# Patient Record
Sex: FEMALE | Race: WHITE | NOT HISPANIC OR LATINO | Employment: OTHER | URBAN - METROPOLITAN AREA
[De-identification: names, ages, dates, MRNs, and addresses within clinical notes are randomized per-mention and may not be internally consistent; named-entity substitution may affect disease eponyms.]

---

## 2017-08-03 ENCOUNTER — OFFICE VISIT (OUTPATIENT)
Dept: INTERNAL MEDICINE | Facility: MEDICAL CENTER | Age: 62
End: 2017-08-03
Payer: COMMERCIAL

## 2017-08-03 VITALS
HEART RATE: 68 BPM | BODY MASS INDEX: 21 KG/M2 | TEMPERATURE: 97.9 F | OXYGEN SATURATION: 97 % | WEIGHT: 123 LBS | SYSTOLIC BLOOD PRESSURE: 100 MMHG | HEIGHT: 64 IN | DIASTOLIC BLOOD PRESSURE: 63 MMHG

## 2017-08-03 DIAGNOSIS — Z00.00 LABORATORY EXAM ORDERED AS PART OF ROUTINE GENERAL MEDICAL EXAMINATION: ICD-10-CM

## 2017-08-03 DIAGNOSIS — M54.50 BILATERAL LOW BACK PAIN WITHOUT SCIATICA, UNSPECIFIED CHRONICITY: ICD-10-CM

## 2017-08-03 DIAGNOSIS — Z00.00 HEALTHCARE MAINTENANCE: ICD-10-CM

## 2017-08-03 PROCEDURE — 99204 OFFICE O/P NEW MOD 45 MIN: CPT | Performed by: INTERNAL MEDICINE

## 2017-08-03 ASSESSMENT — PATIENT HEALTH QUESTIONNAIRE - PHQ9: CLINICAL INTERPRETATION OF PHQ2 SCORE: 0

## 2017-08-03 NOTE — MR AVS SNAPSHOT
"        Monique Ortiz   8/3/2017 11:00 AM   Office Visit   MRN: [id number]    Department:  [facility]   Dept Phone:  [phone]    Description:  Female : 1955   Provider:  Nell Penny M.D.           Reason for Visit     Establish Care new to you       Allergies as of 8/3/2017     Allergen Noted Reactions    Aspirin 2017       Tight chest      You were diagnosed with     Healthcare maintenance   [[id number]]       Laboratory exam ordered as part of routine general medical examination   [[id number]]       Bilateral low back pain without sciatica, unspecified chronicity   [[id number]]       Patient is Zoroastrianism   [[id number]]         Vital Signs     Blood Pressure Pulse Temperature Height Weight Body Mass Index    100/63 mmHg 68 36.6 °C (97.9 °F) 1.626 m (5' 4.02\") 55.792 kg (123 lb) 21.10 kg/m2    Oxygen Saturation Smoking Status                97% Never Smoker           Basic Information     Date Of Birth Sex Race Ethnicity Preferred Language    1955 Female White Non- English      Your appointments     Aug 04, 2017  1:00 PM   Adult Draw/Collection with [facility]   [facility] (--)    [address]  [facility] [address]   [phone]            Aug 21, 2017  8:30 AM   MA DX30 with [facility] MG 2   [facility] ([address]    [address]  [facility] [id number] [phone]              Health Maintenance     Patient has no pending health maintenance at this time      Current Immunizations     No immunizations on file.      Below and/or attached are the medications your provider expects you to take. Review all of your home medications and newly ordered medications with your provider and/or pharmacist. Follow medication instructions as directed by your provider and/or pharmacist. Please keep your medication list with you and share with your provider. Update the information when medications are discontinued, doses are changed, or new " medications (including over-the-counter products) are added; and carry medication information at all times in the event of emergency situations     Allergies:  ASPIRIN - (reactions not documented)               Medications  Valid as of: August 03, 2017 -  4:10 PM    Generic Name Brand Name Tablet Size Instructions for use    .                 Medicines prescribed today were sent to:     [facility] #[id number] - [place], [place] - [address] [place] [address].    [address] [place] [address]    Phone: [phone] Fax: [phone]    Open 24 Hours?: No      Medication refill instructions:       If your prescription bottle indicates you have medication refills left, it is not necessary to call your provider’s office. Please contact your pharmacy and they will refill your medication.    If your prescription bottle indicates you do not have any refills left, you may request refills at any time through one of the following ways: The online [employer] system (except Urgent Care), by calling your provider’s office, or by asking your pharmacy to contact your provider’s office with a refill request. Medication refills are processed only during regular business hours and may not be available until the next business day. Your provider may request additional information or to have a follow-up visit with you prior to refilling your medication.   *Please Note: Medication refills are assigned a new Rx number when refilled electronically. Your pharmacy may indicate that no refills were authorized even though a new prescription for the same medication is available at the pharmacy. Please request the medicine by name with the pharmacy before contacting your provider for a refill.        Your To Do List     Future Labs/Procedures Complete By Expires    CBC WITH DIFFERENTIAL  As directed 8/3/2018    COMP METABOLIC PANEL  As directed 8/3/2018    MA-DIAGNOSTIC MAMMO W/CAD-BILAT  As directed 8/3/2018      Referral     A referral request has been sent to our  patient care coordination department. Please allow 3-5 business days for us to process this request and contact you either by phone or mail. If you do not hear from us by the 5th business day, please call us at [phone].           [employer] Access Code: Activation code not generated  Current BOARDZ Status: Active

## 2017-08-03 NOTE — PROGRESS NOTES
Monique Ortiz is a 61 y.o. female who is here to Establish care and is new to the clinic.     CC: back pain    HPI:    Monique Ortiz is a 61 year old female who is here for routine follow up. Has not been seen for years. She was seeing Dr. Stubbs about 3-4 years ago. Patient also brought paperwork for DPOA.     Sore back: 6 months., achy pain, mostly when she is sitting a long time and it was in the upper back and lower back. She is taking Tylenol for it once in a while. Usually, back pain is worse at the end of the day. She is retired from being a . She does bible studies and other volunteer work but nothing that would cause the back pain. It is not extremely bothersome to her at this point.     Patient denied any complaints of nausea, vomiting, chest pain, sciatica, pain anywhere else in the body, fatigue, or any other complaints. She is a Confucianist so she would not like any blood transfusions. She is active and eats healthy.     Healthcare Maintenance:  Mammogram .   PAP smear 3-4 years ago.   Colonoscopy was >10 years ago.     Allergies: Aspirin (Chest tightness).  Social: Drinks 5 glasses of wine weekly, never smoker, denied any other drug use, , currently not sexually active, has 2 kids and she gave birth at home.    Family Hx: Mom 85: pacemaker in place, Father  of [place] perez in the 80s, No HTN, DM. No other pertinent past family hx.     There are no diagnoses linked to this encounter.    No problem-specific assessment & plan notes found for this encounter.      She  has no past medical history of Breast cancer (CMS-[facility]).    There are no active problems to display for this patient.      Allergies:Aspirin    No current outpatient prescriptions on file.     No current facility-administered medications for this visit.       Social History   Substance Use Topics   • Smoking status: Never Smoker    • Smokeless tobacco: Never Used   • Alcohol Use: 3.0 oz/week     5  "Glasses of wine per week       Family History   Problem Relation Age of Onset   • Cancer Paternal Aunt        Review of Systems:     Pertinent positives as stated in HPI, all others reviewed as negative.    Physical Exam:  Blood pressure 100/63, pulse 68, temperature 36.6 °C (97.9 °F), height 1.626 m (5' 4.02\"), weight 55.792 kg (123 lb), SpO2 97 %. Body mass index is 21.1 kg/(m^2).    General Appearance: healthy, alert, no distress, cooperative  Skin: Skin color, texture, turgor normal. No rashes or lesions.  Head: Normocephalic. No masses appreciated.   Eyes: conjunctivae/corneas clear. PERRLA.  Ears: External ears normal.  Nose/Sinuses: Nares normal. Mucosa normal. No drainage or sinus tenderness.  Oropharynx: Lips, mucosa, and tongue normal. Teeth and gums normal. Oropharynx moist and without lesion.  Neck: Neck supple. No adenopathy. Thyroid symmetric, normal size, and without nodularity.  Lungs: Percussion normal. Lungs clear to auscultation bilaterally.  Heart: RRR without murmur, gallop, or rubs.  Abdomen: Soft, non-tender. BS normal. No masses,  No organomegaly  Musculoskeletal: Extremities: Upper and lower extremities appear normal. No deformities, edema. No tenderness in the back.   Peripheral Pulses: Pulses: radial=4/4, dorsalis pedis=4/4  Psychiatric: Mood appears normal.     Assessment/Plan:     Problem List Items Addressed This Visit     None      Visit Diagnoses     Healthcare maintenance         Relevant Orders     CBC WITH DIFFERENTIAL     COMP METABOLIC PANEL     LIPID PANEL     MA-DIAGNOSTIC MAMMO W/CAD-BILAT     REFERRAL TO GI FOR COLONOSCOPY     Laboratory exam ordered as part of routine general medical examination         Bilateral low back pain without sciatica, unspecified chronicity         Patient is Episcopal             · For the low back pain, would recommend symptomatic management. Taking Tylenol is fine, recommended heat/cold therapy as well. Not extremely bothersome but if it " gets worse, can be further evaluated.  · Will get basic labs, mammogram, and Colonoscopy for screening purposes.  · Patient is a Yazidism and would not like any blood transfusions.     Followup: Return in about 6 months (around 2/3/2018).

## 2017-08-08 ENCOUNTER — HOSPITAL ENCOUNTER (OUTPATIENT)
Dept: LAB | Facility: MEDICAL CENTER | Age: 62
End: 2017-08-08
Attending: INTERNAL MEDICINE
Payer: COMMERCIAL

## 2017-08-08 DIAGNOSIS — Z00.00 HEALTHCARE MAINTENANCE: ICD-10-CM

## 2017-08-08 LAB
ALBUMIN SERPL BCP-MCNC: 4 G/DL (ref 3.2–4.9)
ALBUMIN/GLOB SERPL: 1.4 G/DL
ALP SERPL-CCNC: 58 U/L (ref 30–99)
ALT SERPL-CCNC: 12 U/L (ref 2–50)
ANION GAP SERPL CALC-SCNC: 7 MMOL/L (ref 0–11.9)
AST SERPL-CCNC: 19 U/L (ref 12–45)
BASOPHILS # BLD AUTO: 0.7 % (ref 0–1.8)
BASOPHILS # BLD: 0.03 K/UL (ref 0–0.12)
BILIRUB SERPL-MCNC: 1.1 MG/DL (ref 0.1–1.5)
BUN SERPL-MCNC: 11 MG/DL (ref 8–22)
CALCIUM SERPL-MCNC: 10 MG/DL (ref 8.5–10.5)
CHLORIDE SERPL-SCNC: 105 MMOL/L (ref 96–112)
CHOLEST SERPL-MCNC: 221 MG/DL (ref 100–199)
CO2 SERPL-SCNC: 27 MMOL/L (ref 20–33)
CREAT SERPL-MCNC: 0.5 MG/DL (ref 0.5–1.4)
EOSINOPHIL # BLD AUTO: 0.12 K/UL (ref 0–0.51)
EOSINOPHIL NFR BLD: 2.8 % (ref 0–6.9)
ERYTHROCYTE [DISTWIDTH] IN BLOOD BY AUTOMATED COUNT: 42.6 FL (ref 35.9–50)
GFR SERPL CREATININE-BSD FRML MDRD: >60 ML/MIN/1.73 M 2
GLOBULIN SER CALC-MCNC: 2.9 G/DL (ref 1.9–3.5)
GLUCOSE SERPL-MCNC: 81 MG/DL (ref 65–99)
HCT VFR BLD AUTO: 42.9 % (ref 37–47)
HDLC SERPL-MCNC: 114 MG/DL
HGB BLD-MCNC: 13.9 G/DL (ref 12–16)
IMM GRANULOCYTES # BLD AUTO: 0 K/UL (ref 0–0.11)
IMM GRANULOCYTES NFR BLD AUTO: 0 % (ref 0–0.9)
LDLC SERPL CALC-MCNC: 98 MG/DL
LYMPHOCYTES # BLD AUTO: 1.68 K/UL (ref 1–4.8)
LYMPHOCYTES NFR BLD: 38.7 % (ref 22–41)
MCH RBC QN AUTO: 29.1 PG (ref 27–33)
MCHC RBC AUTO-ENTMCNC: 32.4 G/DL (ref 33.6–35)
MCV RBC AUTO: 89.7 FL (ref 81.4–97.8)
MONOCYTES # BLD AUTO: 0.33 K/UL (ref 0–0.85)
MONOCYTES NFR BLD AUTO: 7.6 % (ref 0–13.4)
NEUTROPHILS # BLD AUTO: 2.18 K/UL (ref 2–7.15)
NEUTROPHILS NFR BLD: 50.2 % (ref 44–72)
NRBC # BLD AUTO: 0 K/UL
NRBC BLD AUTO-RTO: 0 /100 WBC
PLATELET # BLD AUTO: 153 K/UL (ref 164–446)
PMV BLD AUTO: 9.9 FL (ref 9–12.9)
POTASSIUM SERPL-SCNC: 4.4 MMOL/L (ref 3.6–5.5)
PROT SERPL-MCNC: 6.9 G/DL (ref 6–8.2)
RBC # BLD AUTO: 4.78 M/UL (ref 4.2–5.4)
SODIUM SERPL-SCNC: 139 MMOL/L (ref 135–145)
TRIGL SERPL-MCNC: 43 MG/DL (ref 0–149)
WBC # BLD AUTO: 4.3 K/UL (ref 4.8–10.8)

## 2017-08-08 PROCEDURE — 80061 LIPID PANEL: CPT

## 2017-08-08 PROCEDURE — 80053 COMPREHEN METABOLIC PANEL: CPT

## 2017-08-08 PROCEDURE — 36415 COLL VENOUS BLD VENIPUNCTURE: CPT

## 2017-08-08 PROCEDURE — 85025 COMPLETE CBC W/AUTO DIFF WBC: CPT

## 2017-08-15 ENCOUNTER — TELEPHONE (OUTPATIENT)
Dept: RADIOLOGY | Facility: MEDICAL CENTER | Age: 62
End: 2017-08-15

## 2017-08-15 NOTE — TELEPHONE ENCOUNTER
LM FOR PT TO CALL - NEED TO CONFIRM SHE IS NOT HAVING ANY BREAST ISSUES; IF NOT, CAN R/S TO SCREENING AS  HAS SIGNED AN ORDER/TML

## 2017-08-21 ENCOUNTER — HOSPITAL ENCOUNTER (OUTPATIENT)
Dept: RADIOLOGY | Facility: MEDICAL CENTER | Age: 62
End: 2017-08-21
Attending: INTERNAL MEDICINE
Payer: COMMERCIAL

## 2017-08-21 DIAGNOSIS — Z12.31 VISIT FOR SCREENING MAMMOGRAM: ICD-10-CM

## 2017-08-21 PROCEDURE — 77063 BREAST TOMOSYNTHESIS BI: CPT

## 2017-11-07 ENCOUNTER — OFFICE VISIT (OUTPATIENT)
Dept: INTERNAL MEDICINE | Facility: MEDICAL CENTER | Age: 62
End: 2017-11-07
Payer: COMMERCIAL

## 2017-11-07 VITALS
HEIGHT: 64 IN | TEMPERATURE: 98.5 F | WEIGHT: 124 LBS | DIASTOLIC BLOOD PRESSURE: 55 MMHG | HEART RATE: 75 BPM | BODY MASS INDEX: 21.17 KG/M2 | SYSTOLIC BLOOD PRESSURE: 89 MMHG | OXYGEN SATURATION: 96 %

## 2017-11-07 DIAGNOSIS — Z00.00 HEALTHCARE MAINTENANCE: ICD-10-CM

## 2017-11-07 DIAGNOSIS — R04.0 BLEEDING FROM THE NOSE: ICD-10-CM

## 2017-11-07 DIAGNOSIS — Z23 NEED FOR INFLUENZA VACCINATION: ICD-10-CM

## 2017-11-07 PROCEDURE — 90686 IIV4 VACC NO PRSV 0.5 ML IM: CPT | Performed by: INTERNAL MEDICINE

## 2017-11-07 PROCEDURE — 90471 IMMUNIZATION ADMIN: CPT | Performed by: INTERNAL MEDICINE

## 2017-11-07 PROCEDURE — 99213 OFFICE O/P EST LOW 20 MIN: CPT | Mod: 25 | Performed by: INTERNAL MEDICINE

## 2017-11-07 NOTE — PROGRESS NOTES
"Monique Ortiz is a 61 y.o. female here for a non-provider visit for:   FLU    Reason for immunization: Annual Flu Vaccine  Immunization records indicate need for vaccine: Yes, confirmed with Epic  Minimum interval has been met for this vaccine: Yes  ABN completed: Not Indicated    Order and dose verified by: Kevin MOSS Dated  8/7/2015 was given to patient: Yes  All IAC Questionnaire questions were answered \"No.\"    Patient tolerated injection and no adverse effects were observed or reported: Yes    Pt scheduled for next dose in series: Not Indicated    "

## 2017-11-07 NOTE — PROGRESS NOTES
CC: Nosebleed    HPI:    Monique Ortiz is a 61 y.o. female who is here for complaints of having nosebleeds. Started years ago. Occurs more often in the summer months but recently have been occurring unexpectedly. She says it will occur when she is driving and going out and sometimes even when she is laying in bed. She made the appointment when it was bleeding and now it has stopped. She has never been tested for allergies but says that perfumes and things with a strong odor, bring upon allergie symptoms. She doesn't use a humidifier at home. Denied any scratching, picking the nose, no trauma to the nose. Denied any fever, chills. No bleeding from anywhere else. Not on aspirin or anticoagulants. She denied any family history of bleeding disorders. Patient denied any headache, visual changes, abdominal pain, diarrhea, constipation.       Recently, she had a colonoscopy done which showed 3 polyps and she said 2 of them were ability to become cancerous so it was recommended that she get a colonoscopy in 2-3 years.     Haven't had a flu shot yet but wants one. Needs to schedule for a PAP smear.   Wants to get the shingles vaccine. Advised to go to pharmacy.       No problem-specific Assessment & Plan notes found for this encounter.      She  has no past medical history of Breast cancer (CMS-[facility]).    There are no active problems to display for this patient.      Allergies:Aspirin    No current outpatient prescriptions on file.     No current facility-administered medications for this visit.        Social History   Substance Use Topics   • Smoking status: Never Smoker   • Smokeless tobacco: Never Used   • Alcohol use 3.0 oz/week     5 Glasses of wine per week       Family History   Problem Relation Age of Onset   • Cancer Paternal Aunt        Review of Systems:     Pertinent positives as stated in HPI, all others reviewed as negative.    Physical Exam:  Blood pressure (!) 89/55, pulse 75, temperature 36.9 °C  "(98.5 °F), height 1.626 m (5' 4.02\"), weight 56.2 kg (124 lb), SpO2 96 %. Body mass index is 21.27 kg/m².    General Appearance: healthy, alert, no distress, cooperative  Skin: No rashes or lesions.  Head: Normocephalic. No masses appreciated.   Nose: External nose examination without any external lesions or deformities noted. Internal examination of the left nare shows a dried scab and erythema but otherwise no internal lesions seen. Right nare appears intact, patent, without erythema or any lesions or deformities.   Lungs: Lungs clear to auscultation bilaterally.  Heart: RRR without murmur, gallop, or rubs.  Psychiatric: Mood appears normal.     Assessment/Plan:     1. Need for influenza vaccination  - Will administer flu vaccine.   - Flu Quad Inj >3 Year Pre-Filled PF    2. Bleeding from the nose  - Likely secondary to dryness. Could have allergic or vasomotor rhinitis.   - No trauma, not on any medications that may cause it, no family history of bleeding disorders.   - Has been a major issue recently for her. Occurs in unexpected situations.  - Wanted to get it cauterized. Not actively bleeding at this time.   - Recommended humidifier.   - Will make referral to ENT for any recommendations.  - REFERRAL TO ENT    3. Healthcare maintenance  - Up to date on mammogram. Due in 2018.  - Had colonoscopy done which showed 3 polyps with 2 of them with potential malignant transformation.  - Recommended repeat colonoscopy in 2-3 years.  - Will get flu vaccine.  - Shingles from pharmacy.  - Schedule an appointment for a PAP smear early in 2018.   - Went over recent lab work in detail. No need for statin.       Followup: Return in about 1 year (around 11/7/2018), or if symptoms worsen or fail to improve.    "

## 2018-05-07 ENCOUNTER — APPOINTMENT (RX ONLY)
Dept: URBAN - METROPOLITAN AREA CLINIC 4 | Facility: CLINIC | Age: 63
Setting detail: DERMATOLOGY
End: 2018-05-07

## 2018-05-07 DIAGNOSIS — L82.1 OTHER SEBORRHEIC KERATOSIS: ICD-10-CM

## 2018-05-07 DIAGNOSIS — D22 MELANOCYTIC NEVI: ICD-10-CM

## 2018-05-07 DIAGNOSIS — D18.0 HEMANGIOMA: ICD-10-CM

## 2018-05-07 DIAGNOSIS — L81.4 OTHER MELANIN HYPERPIGMENTATION: ICD-10-CM

## 2018-05-07 DIAGNOSIS — L73.8 OTHER SPECIFIED FOLLICULAR DISORDERS: ICD-10-CM

## 2018-05-07 PROBLEM — D22.61 MELANOCYTIC NEVI OF RIGHT UPPER LIMB, INCLUDING SHOULDER: Status: ACTIVE | Noted: 2018-05-07

## 2018-05-07 PROBLEM — D22.62 MELANOCYTIC NEVI OF LEFT UPPER LIMB, INCLUDING SHOULDER: Status: ACTIVE | Noted: 2018-05-07

## 2018-05-07 PROBLEM — D18.01 HEMANGIOMA OF SKIN AND SUBCUTANEOUS TISSUE: Status: ACTIVE | Noted: 2018-05-07

## 2018-05-07 PROBLEM — D22.5 MELANOCYTIC NEVI OF TRUNK: Status: ACTIVE | Noted: 2018-05-07

## 2018-05-07 PROBLEM — D22.71 MELANOCYTIC NEVI OF RIGHT LOWER LIMB, INCLUDING HIP: Status: ACTIVE | Noted: 2018-05-07

## 2018-05-07 PROBLEM — D22.72 MELANOCYTIC NEVI OF LEFT LOWER LIMB, INCLUDING HIP: Status: ACTIVE | Noted: 2018-05-07

## 2018-05-07 PROBLEM — D48.5 NEOPLASM OF UNCERTAIN BEHAVIOR OF SKIN: Status: ACTIVE | Noted: 2018-05-07

## 2018-05-07 PROCEDURE — ? ADDITIONAL NOTES

## 2018-05-07 PROCEDURE — 99202 OFFICE O/P NEW SF 15 MIN: CPT | Mod: 25

## 2018-05-07 PROCEDURE — ? BIOPSY BY SHAVE METHOD

## 2018-05-07 PROCEDURE — ? COUNSELING

## 2018-05-07 PROCEDURE — 11100: CPT

## 2018-05-07 PROCEDURE — ? SUNSCREEN RECOMMENDATIONS

## 2018-05-07 ASSESSMENT — LOCATION SIMPLE DESCRIPTION DERM
LOCATION SIMPLE: LEFT ANTERIOR NECK
LOCATION SIMPLE: RIGHT HAND
LOCATION SIMPLE: LEFT UPPER BACK
LOCATION SIMPLE: LEFT FOREARM
LOCATION SIMPLE: RIGHT FOREARM
LOCATION SIMPLE: LEFT HAND
LOCATION SIMPLE: LEFT CHEEK
LOCATION SIMPLE: LEFT THIGH
LOCATION SIMPLE: RIGHT CHEEK
LOCATION SIMPLE: RIGHT LOWER BACK
LOCATION SIMPLE: ABDOMEN
LOCATION SIMPLE: RIGHT POSTERIOR UPPER ARM
LOCATION SIMPLE: UPPER BACK
LOCATION SIMPLE: LEFT POSTERIOR UPPER ARM
LOCATION SIMPLE: RIGHT THIGH

## 2018-05-07 ASSESSMENT — LOCATION DETAILED DESCRIPTION DERM
LOCATION DETAILED: PERIUMBILICAL SKIN
LOCATION DETAILED: RIGHT PROXIMAL DORSAL FOREARM
LOCATION DETAILED: RIGHT ANTERIOR PROXIMAL THIGH
LOCATION DETAILED: LEFT INFERIOR CENTRAL MALAR CHEEK
LOCATION DETAILED: LEFT INFERIOR ANTERIOR NECK
LOCATION DETAILED: RIGHT CENTRAL MALAR CHEEK
LOCATION DETAILED: RIGHT INFERIOR MEDIAL MALAR CHEEK
LOCATION DETAILED: LEFT ULNAR DORSAL HAND
LOCATION DETAILED: SUPERIOR THORACIC SPINE
LOCATION DETAILED: LEFT PROXIMAL POSTERIOR UPPER ARM
LOCATION DETAILED: LEFT CENTRAL MALAR CHEEK
LOCATION DETAILED: LEFT ANTERIOR PROXIMAL THIGH
LOCATION DETAILED: LEFT SUPERIOR MEDIAL UPPER BACK
LOCATION DETAILED: LEFT INFERIOR MEDIAL MALAR CHEEK
LOCATION DETAILED: RIGHT DISTAL POSTERIOR UPPER ARM
LOCATION DETAILED: RIGHT RIB CAGE
LOCATION DETAILED: RIGHT RADIAL DORSAL HAND
LOCATION DETAILED: LEFT PROXIMAL DORSAL FOREARM
LOCATION DETAILED: RIGHT SUPERIOR MEDIAL MIDBACK

## 2018-05-07 ASSESSMENT — LOCATION ZONE DERM
LOCATION ZONE: HAND
LOCATION ZONE: NECK
LOCATION ZONE: ARM
LOCATION ZONE: FACE
LOCATION ZONE: LEG
LOCATION ZONE: TRUNK

## 2018-05-07 NOTE — PROCEDURE: ADDITIONAL NOTES
Additional Notes: Patient will have one removed today and will see how she heals. If she does not mind the way her scar and healing process is, we will remove remaining in one appointment made by PT.

## 2018-08-24 ENCOUNTER — APPOINTMENT (RX ONLY)
Dept: URBAN - METROPOLITAN AREA CLINIC 4 | Facility: CLINIC | Age: 63
Setting detail: DERMATOLOGY
End: 2018-08-24

## 2018-08-24 DIAGNOSIS — D22 MELANOCYTIC NEVI: ICD-10-CM

## 2018-08-24 DIAGNOSIS — L82.1 OTHER SEBORRHEIC KERATOSIS: ICD-10-CM

## 2018-08-24 PROBLEM — D48.5 NEOPLASM OF UNCERTAIN BEHAVIOR OF SKIN: Status: ACTIVE | Noted: 2018-08-24

## 2018-08-24 PROCEDURE — ? BIOPSY BY SHAVE METHOD AND DESTRUCTION

## 2018-08-24 PROCEDURE — 99212 OFFICE O/P EST SF 10 MIN: CPT | Mod: 25

## 2018-08-24 PROCEDURE — 11100: CPT

## 2018-08-24 PROCEDURE — ? COUNSELING

## 2018-08-24 ASSESSMENT — LOCATION ZONE DERM
LOCATION ZONE: NECK
LOCATION ZONE: TRUNK

## 2018-08-24 ASSESSMENT — LOCATION DETAILED DESCRIPTION DERM
LOCATION DETAILED: RIGHT MEDIAL UPPER BACK
LOCATION DETAILED: LEFT CENTRAL LATERAL NECK

## 2018-08-24 ASSESSMENT — LOCATION SIMPLE DESCRIPTION DERM
LOCATION SIMPLE: NECK
LOCATION SIMPLE: RIGHT UPPER BACK

## 2018-08-24 NOTE — PROCEDURE: BIOPSY BY SHAVE METHOD AND DESTRUCTION
Bill For Surgical Tray: no
Billing Type: Third-Party Bill
Bill As?: Biopsy by Shave Method
Anesthesia Type: 1% lidocaine with epinephrine
Wound Care: Petrolatum
Lab: [id number]
Size Of Lesion After Curettage: 0
Detail Level: Detailed
Lab Facility: 
Post-Care Instructions: I reviewed with the patient in detail post-care instructions. Patient is to keep the biopsy site dry overnight, and then apply bacitracin twice daily until healed. Patient may apply hydrogen peroxide soaks to remove any crusting.
Destruction Type: electrodesiccation
Number Of Curettages: 3
Biopsy Type: H and E
Hemostasis: Drysol
Anesthesia Volume In Cc: 2
Notification Instructions: Patient will be notified of biopsy results. However, patient instructed to call the office if not contacted within 2 weeks.
Consent: Written consent was obtained and risks were reviewed including but not limited to scarring, infection, bleeding, scabbing, incomplete removal, nerve damage and allergy to anesthesia.

## 2019-11-15 ENCOUNTER — PATIENT OUTREACH (OUTPATIENT)
Dept: HEALTH INFORMATION MANAGEMENT | Facility: OTHER | Age: 64
End: 2019-11-15

## 2019-11-15 NOTE — PROGRESS NOTES
Outcome: No answer     Please transfer to Patient Outreach Team at [phone] when patient returns call.    [facility] Verified: yes    Attempt # 1

## 2020-02-21 ENCOUNTER — OFFICE VISIT (OUTPATIENT)
Dept: URGENT CARE | Facility: PHYSICIAN GROUP | Age: 65
End: 2020-02-21
Payer: COMMERCIAL

## 2020-02-21 VITALS
SYSTOLIC BLOOD PRESSURE: 110 MMHG | RESPIRATION RATE: 18 BRPM | HEART RATE: 72 BPM | DIASTOLIC BLOOD PRESSURE: 66 MMHG | OXYGEN SATURATION: 98 % | BODY MASS INDEX: 21.51 KG/M2 | TEMPERATURE: 97.8 F | HEIGHT: 64 IN | WEIGHT: 126 LBS

## 2020-02-21 DIAGNOSIS — J06.9 UPPER RESPIRATORY TRACT INFECTION, UNSPECIFIED TYPE: ICD-10-CM

## 2020-02-21 PROCEDURE — 99203 OFFICE O/P NEW LOW 30 MIN: CPT | Performed by: PHYSICIAN ASSISTANT

## 2020-02-21 ASSESSMENT — ENCOUNTER SYMPTOMS
MYALGIAS: 0
SINUS PAIN: 1
EYE REDNESS: 0
VOMITING: 0
COUGH: 1
DIARRHEA: 0
CHILLS: 0
DIZZINESS: 0
SPUTUM PRODUCTION: 1
NECK PAIN: 0
SORE THROAT: 1
FEVER: 0
SINUS PRESSURE: 1
WHEEZING: 0
EYE DISCHARGE: 0
HEADACHES: 1

## 2020-02-21 NOTE — PROGRESS NOTES
"Subjective:      Monique Ortiz is a 64 y.o. female who presents with Sinusitis (earaches, chest congestion, cough, fever blister, post nasal sore throat x1week)            Sinusitis   This is a new problem. Episode onset: 6-7 days ago. The problem has been gradually improving since onset. There has been no fever. Associated symptoms include congestion, coughing, headaches, sinus pressure and a sore throat. Pertinent negatives include no chills, ear pain or neck pain. (Pos for ear pressure.   ) Past treatments include acetaminophen. The treatment provided mild relief.       Review of Systems   Constitutional: Positive for malaise/fatigue. Negative for chills and fever.   HENT: Positive for congestion, sinus pressure, sinus pain and sore throat. Negative for ear discharge and ear pain.         Pos. For ear pressure     Eyes: Negative for discharge and redness.   Respiratory: Positive for cough and sputum production. Negative for wheezing.    Gastrointestinal: Negative for diarrhea and vomiting.   Genitourinary: Negative for dysuria.   Musculoskeletal: Negative for myalgias and neck pain.   Skin: Negative for itching and rash.   Neurological: Positive for headaches. Negative for dizziness.          Objective:     /66 (BP Location: Right arm, Patient Position: Sitting, BP Cuff Size: Adult)   Pulse 72   Temp 36.6 °C (97.8 °F) (Temporal)   Resp 18   Ht 1.626 m (5' 4\")   Wt 57.2 kg (126 lb)   SpO2 98%   BMI 21.63 kg/m²    PMH:  has no past medical history of Breast cancer (HCC).  MEDS: Reviewed .   ALLERGIES:   Allergies   Allergen Reactions   • Aspirin      Tight chest     SURGHX: History reviewed. No pertinent surgical history.  SOCHX:  reports that she has never smoked. She has never used smokeless tobacco. She reports current alcohol use of about 3.0 oz of alcohol per week. She reports that she does not use drugs.  FH: Family history was reviewed, no pertinent findings to report      Physical " Exam  Vitals signs reviewed.   Constitutional:       Appearance: Normal appearance. She is well-developed.   HENT:      Head: Normocephalic and atraumatic.      Ears:      Comments: Bilateral clear middle ear effusions.     Nose:      Comments: Rhinorrhea noted.     Mouth/Throat:      Comments: Posterior oropharynx is erythematous, positive postnasal drainage.  No evidence of exudate.  Eyes:      Conjunctiva/sclera: Conjunctivae normal.      Pupils: Pupils are equal, round, and reactive to light.   Neck:      Musculoskeletal: Normal range of motion and neck supple.   Cardiovascular:      Rate and Rhythm: Normal rate and regular rhythm.      Heart sounds: No murmur.   Pulmonary:      Effort: Pulmonary effort is normal. No respiratory distress.      Breath sounds: Normal breath sounds.   Musculoskeletal: Normal range of motion.      Right lower leg: No edema.      Left lower leg: No edema.   Lymphadenopathy:      Cervical: No cervical adenopathy.   Skin:     General: Skin is warm.      Findings: No rash.   Neurological:      Mental Status: She is alert and oriented to person, place, and time.   Psychiatric:         Mood and Affect: Mood normal.         Behavior: Behavior normal.         Thought Content: Thought content normal.                 Assessment/Plan:       1. Upper respiratory tract infection, unspecified type    It was explained today that due to the viral nature of the pt's illness, we will treat symptomatically today.   Encouraged OTC supportive meds PRN. Humidification, increase fluids, avoid night time dairy.     Patient does have history of sinus concerns in the past of which noted secondary bacterial infection in the sinuses and the sequela that this can cause.  She is to reach out if next week she has worsening symptoms i.e. sinus pain and pressure, dental pain, worsening headache and congestion and would be happy to then send an antibiotic however at this time it does appear that patient is improving  at this time.    Discussed side effects of OTC meds and any prescribed.  Given precautionary s/sx that mandate immediate follow up and evaluation in the ED. Advised of risks of not doing so.    DDX, Supportive care, and indications for immediate follow-up discussed with patient.    Instructed to return to clinic or nearest emergency department if we are not available for any change in condition, further concerns, or worsening of symptoms.    The patient  and/or guardian demonstrated a good understanding and agreed with the treatment plan.    Please note that this dictation was created using voice recognition software. I have made every reasonable attempt to correct obvious errors, but I expect that there are errors of grammar and possibly content that I did not discover before finalizing the note.

## 2020-08-17 ENCOUNTER — HOSPITAL ENCOUNTER (OUTPATIENT)
Dept: LAB | Facility: MEDICAL CENTER | Age: 65
End: 2020-08-17
Attending: INTERNAL MEDICINE
Payer: MEDICARE

## 2020-08-17 LAB
25(OH)D3 SERPL-MCNC: 21 NG/ML (ref 30–100)
ALBUMIN SERPL BCP-MCNC: 4.1 G/DL (ref 3.2–4.9)
ALBUMIN/GLOB SERPL: 1.7 G/DL
ALP SERPL-CCNC: 53 U/L (ref 30–99)
ALT SERPL-CCNC: 13 U/L (ref 2–50)
ANION GAP SERPL CALC-SCNC: 11 MMOL/L (ref 7–16)
AST SERPL-CCNC: 14 U/L (ref 12–45)
BASOPHILS # BLD AUTO: 0.3 % (ref 0–1.8)
BASOPHILS # BLD: 0.01 K/UL (ref 0–0.12)
BILIRUB SERPL-MCNC: 0.4 MG/DL (ref 0.1–1.5)
BUN SERPL-MCNC: 16 MG/DL (ref 8–22)
CALCIUM SERPL-MCNC: 9.7 MG/DL (ref 8.5–10.5)
CHLORIDE SERPL-SCNC: 105 MMOL/L (ref 96–112)
CHOLEST SERPL-MCNC: 221 MG/DL (ref 100–199)
CO2 SERPL-SCNC: 23 MMOL/L (ref 20–33)
CREAT SERPL-MCNC: 0.55 MG/DL (ref 0.5–1.4)
EOSINOPHIL # BLD AUTO: 0.07 K/UL (ref 0–0.51)
EOSINOPHIL NFR BLD: 1.8 % (ref 0–6.9)
ERYTHROCYTE [DISTWIDTH] IN BLOOD BY AUTOMATED COUNT: 44.4 FL (ref 35.9–50)
FASTING STATUS PATIENT QL REPORTED: NORMAL
FOLATE SERPL-MCNC: 12.9 NG/ML
GLOBULIN SER CALC-MCNC: 2.4 G/DL (ref 1.9–3.5)
GLUCOSE SERPL-MCNC: 83 MG/DL (ref 65–99)
HCT VFR BLD AUTO: 44.6 % (ref 37–47)
HDLC SERPL-MCNC: 95 MG/DL
HGB BLD-MCNC: 14.1 G/DL (ref 12–16)
IMM GRANULOCYTES # BLD AUTO: 0.01 K/UL (ref 0–0.11)
IMM GRANULOCYTES NFR BLD AUTO: 0.3 % (ref 0–0.9)
LDLC SERPL CALC-MCNC: 105 MG/DL
LYMPHOCYTES # BLD AUTO: 1.55 K/UL (ref 1–4.8)
LYMPHOCYTES NFR BLD: 39.6 % (ref 22–41)
MCH RBC QN AUTO: 29.1 PG (ref 27–33)
MCHC RBC AUTO-ENTMCNC: 31.6 G/DL (ref 33.6–35)
MCV RBC AUTO: 92.1 FL (ref 81.4–97.8)
MONOCYTES # BLD AUTO: 0.28 K/UL (ref 0–0.85)
MONOCYTES NFR BLD AUTO: 7.2 % (ref 0–13.4)
NEUTROPHILS # BLD AUTO: 1.99 K/UL (ref 2–7.15)
NEUTROPHILS NFR BLD: 50.8 % (ref 44–72)
NRBC # BLD AUTO: 0 K/UL
NRBC BLD-RTO: 0 /100 WBC
PLATELET # BLD AUTO: 142 K/UL (ref 164–446)
PMV BLD AUTO: 9.9 FL (ref 9–12.9)
POTASSIUM SERPL-SCNC: 4.1 MMOL/L (ref 3.6–5.5)
PROT SERPL-MCNC: 6.5 G/DL (ref 6–8.2)
RBC # BLD AUTO: 4.84 M/UL (ref 4.2–5.4)
SODIUM SERPL-SCNC: 139 MMOL/L (ref 135–145)
TRIGL SERPL-MCNC: 103 MG/DL (ref 0–149)
TSH SERPL DL<=0.005 MIU/L-ACNC: 1.16 UIU/ML (ref 0.38–5.33)
VIT B12 SERPL-MCNC: 544 PG/ML (ref 211–911)
WBC # BLD AUTO: 3.9 K/UL (ref 4.8–10.8)

## 2020-08-17 PROCEDURE — 85025 COMPLETE CBC W/AUTO DIFF WBC: CPT

## 2020-08-17 PROCEDURE — 82607 VITAMIN B-12: CPT

## 2020-08-17 PROCEDURE — 80053 COMPREHEN METABOLIC PANEL: CPT

## 2020-08-17 PROCEDURE — 36415 COLL VENOUS BLD VENIPUNCTURE: CPT

## 2020-08-17 PROCEDURE — 82306 VITAMIN D 25 HYDROXY: CPT

## 2020-08-17 PROCEDURE — 82746 ASSAY OF FOLIC ACID SERUM: CPT

## 2020-08-17 PROCEDURE — 84443 ASSAY THYROID STIM HORMONE: CPT

## 2020-08-17 PROCEDURE — 80061 LIPID PANEL: CPT

## 2020-10-23 ENCOUNTER — HOSPITAL ENCOUNTER (OUTPATIENT)
Dept: RADIOLOGY | Facility: MEDICAL CENTER | Age: 65
End: 2020-10-23
Attending: INTERNAL MEDICINE
Payer: MEDICARE

## 2020-10-23 DIAGNOSIS — Z12.31 VISIT FOR SCREENING MAMMOGRAM: ICD-10-CM

## 2020-10-23 PROCEDURE — 77067 SCR MAMMO BI INCL CAD: CPT

## 2020-12-16 ENCOUNTER — TELEPHONE (OUTPATIENT)
Dept: SCHEDULING | Facility: IMAGING CENTER | Age: 65
End: 2020-12-16

## 2021-01-14 ENCOUNTER — OFFICE VISIT (OUTPATIENT)
Dept: MEDICAL GROUP | Facility: PHYSICIAN GROUP | Age: 66
End: 2021-01-14
Payer: MEDICARE

## 2021-01-14 VITALS
SYSTOLIC BLOOD PRESSURE: 102 MMHG | HEIGHT: 64 IN | DIASTOLIC BLOOD PRESSURE: 68 MMHG | BODY MASS INDEX: 20.22 KG/M2 | WEIGHT: 118.4 LBS | HEART RATE: 74 BPM | OXYGEN SATURATION: 98 % | TEMPERATURE: 98.2 F | RESPIRATION RATE: 12 BRPM

## 2021-01-14 DIAGNOSIS — Z23 NEED FOR VACCINATION: ICD-10-CM

## 2021-01-14 DIAGNOSIS — J30.2 SEASONAL ALLERGIC RHINITIS, UNSPECIFIED TRIGGER: ICD-10-CM

## 2021-01-14 DIAGNOSIS — E55.9 VITAMIN D DEFICIENCY: ICD-10-CM

## 2021-01-14 DIAGNOSIS — E78.5 HYPERLIPIDEMIA, UNSPECIFIED HYPERLIPIDEMIA TYPE: ICD-10-CM

## 2021-01-14 DIAGNOSIS — Z00.00 WELL ADULT EXAM: Primary | ICD-10-CM

## 2021-01-14 DIAGNOSIS — R41.3 MEMORY IMPAIRMENT: ICD-10-CM

## 2021-01-14 PROBLEM — Z76.89 ENCOUNTER TO ESTABLISH CARE: Status: ACTIVE | Noted: 2021-01-14

## 2021-01-14 PROCEDURE — 99215 OFFICE O/P EST HI 40 MIN: CPT | Mod: 25 | Performed by: NURSE PRACTITIONER

## 2021-01-14 PROCEDURE — G0008 ADMIN INFLUENZA VIRUS VAC: HCPCS | Performed by: NURSE PRACTITIONER

## 2021-01-14 PROCEDURE — 90662 IIV NO PRSV INCREASED AG IM: CPT | Performed by: NURSE PRACTITIONER

## 2021-01-14 RX ORDER — LORATADINE 10 MG/1
10 TABLET ORAL
COMMUNITY
Start: 2020-08-12 | End: 2021-11-23

## 2021-01-14 ASSESSMENT — PATIENT HEALTH QUESTIONNAIRE - PHQ9: CLINICAL INTERPRETATION OF PHQ2 SCORE: 0

## 2021-01-14 ASSESSMENT — FIBROSIS 4 INDEX: FIB4 SCORE: 1.8

## 2021-01-14 NOTE — ASSESSMENT & PLAN NOTE
Patient had a low vitamin D in August.  Since that time she has been taking vitamin D supplementation daily.

## 2021-01-14 NOTE — ASSESSMENT & PLAN NOTE
Chronic condition, stable.  Patient LDL was slightly elevated over her last value, patient reports a change in food choices since the Covid pandemic.

## 2021-01-14 NOTE — ASSESSMENT & PLAN NOTE
The patient presents with her  today.  They relate that since the Covid pandemic began, the patient initially became depressed, but this has mostly resolved.  Also during this time, the patient's daughters who live in [place], have reported to both patient and her  that they feel the patient is having issues with her memory.  The patient's mother also feels that the patient has had some issues with her memory.  The patient's  feels that she does have some issues with her short-term memory however he does not feel that this impairs her ability to make sound decisions.  The patient and her  relate that at their prior PCP office, they brought this issue up to him and the patient was immediately given a Mini-Mental status exam, in which the patient was surprised by, became anxious and had a difficult time doing the test.  Their prior PCP did refer them to neurology, but I do not have any of their prior office records to review today.

## 2021-01-14 NOTE — PROGRESS NOTES
Subjective:     CC: Establish care.    HPI:   Monique presents today with with her  to establish care with me.  Her past medical, social, and surgical history were reviewed today.  The following issues were discussed:    Seasonal allergic rhinitis  Chronic condition, stable.  Patient takes over-the-counter Claritin as needed.    Hyperlipidemia  Chronic condition, stable.  Patient LDL was slightly elevated over her last value, patient reports a change in food choices since the Covid pandemic.    Vitamin D deficiency  Patient had a low vitamin D in August.  Since that time she has been taking vitamin D supplementation daily.    Memory impairment  The patient presents with her  today.  They relate that since the Covid pandemic began, the patient initially became depressed, but this has mostly resolved.  Also during this time, the patient's daughters who live in [place], have reported to both patient and her  that they feel the patient is having issues with her memory.  The patient's mother also feels that the patient has had some issues with her memory.  The patient's  feels that she does have some issues with her short-term memory however he does not feel that this impairs her ability to make sound decisions.  The patient and her  relate that at their prior PCP office, they brought this issue up to him and the patient was immediately given a Mini-Mental status exam, in which the patient was surprised by, became anxious and had a difficult time doing the test.  Their prior PCP did refer them to neurology, but I do not have any of their prior office records to review today.      No past medical history on file.    Social History     Tobacco Use   • Smoking status: Never Smoker   • Smokeless tobacco: Never Used   Substance Use Topics   • Alcohol use: Yes     Alcohol/week: 3.0 oz     Types: 5 Glasses of wine per week   • Drug use: No       Current Outpatient Medications Ordered in Epic  "  Medication Sig Dispense Refill   • loratadine (CLARITIN) 10 MG Tab Take 10 mg by mouth 1 time a day as needed.       No current Epic-ordered facility-administered medications on file.        Allergies:  Seasonal and Aspirin    Health Maintenance: Completed    ROS:  Gen: no fevers/chills, no changes in weight  Eyes: no changes in vision  ENT: no sore throat, no hearing loss, no bloody nose  Pulm: no sob, no cough  CV: no chest pain, no palpitations  GI: no nausea/vomiting, no diarrhea  : no dysuria  MSk: no myalgias  Skin: no rash  Neuro: no headaches, no numbness/tingling  Heme/Lymph: no easy bruising      Objective:       Exam:  /68   Pulse 74   Temp 36.8 °C (98.2 °F)   Resp 12   Ht 1.626 m (5' 4\")   Wt 53.7 kg (118 lb 6.4 oz)   SpO2 98%   BMI 20.32 kg/m²  Body mass index is 20.32 kg/m².    Gen: Alert and oriented, No apparent distress.  Neck: Neck is supple without lymphadenopathy.  Lungs: Normal effort, CTA bilaterally, no wheezes, rhonchi, or rales  CV: Regular rate and rhythm. No murmurs, rubs, or gallops.  Ext: No clubbing, cyanosis, edema.    Labs: Labs from August 2020 were reviewed with the patient and her  today.    Assessment & Plan:     65 y.o. female with the following -     1. Well adult exam  2. Need for vaccination  Will update labs today.  Awaiting prior records from PCP for review.  - VITAMIN D,25 HYDROXY; Future  - CBC WITH DIFFERENTIAL; Future  - Comp Metabolic Panel; Future  - INFLUENZA VACCINE, HIGH DOSE (65+ ONLY)    3. Seasonal allergic rhinitis, unspecified trigger  Condition, stable.    4. Hyperlipidemia, unspecified hyperlipidemia type  Chronic condition, stable.    5. Vitamin D deficiency  Noted on labs from August 2020.  Patient taking daily vitamin D supplementation at home.    6. Memory impairment  Patient has an upcoming appointment with a neurologist at the end of February.  Advised to keep this appointment, and follow-up with me afterward to determine further " follow-up and treatment plan.    Approximately 40 minutes was spent discussion the patient's history and potential memory issues today.     Return in about 2 months (around 3/14/2021) for March 2021.    Please note that this dictation was created using voice recognition software. I have made every reasonable attempt to correct obvious errors, but I expect that there are errors of grammar and possibly content that I did not discover before finalizing the note.

## 2021-01-14 NOTE — LETTER
[email or web address]    Fax: [phone]   Authorization for Release/Disclosure of   Protected Health Information   Name: MARTIN GONZÁLES : 1955 SSN: [id number]   Address: [facility] [place] [address] Phone:    [phone] (home)    I authorize the entity listed below to release/disclose the PHI below to:   [facility]/MELANIE Logan and MELANIE Logan   Provider or Entity Name:  [facility]   Address   City, State, [facility]   Phone:      Fax:  [phone]   Reason for request: continuity of care   Information to be released:    [  ] LAST COLONOSCOPY,  including any PATH REPORT and follow-up  [  ] LAST FIT/COLOGUARD RESULT [  ] LAST DEXA  [  ] LAST MAMMOGRAM  [  ] LAST PAP  [  ] LAST LABS [  ] RETINA EXAM REPORT  [  ] IMMUNIZATION RECORDS  [ x ] Release all info      [  ] Check here and initial the line next to each item to release ALL health information INCLUDING  _____ Care and treatment for drug and / or alcohol abuse  _____ HIV testing, infection status, or AIDS  _____ Genetic Testing    DATES OF SERVICE OR TIME PERIOD TO BE DISCLOSED: _____________  I understand and acknowledge that:  * This Authorization may be revoked at any time by you in writing, except if your health information has already been used or disclosed.  * Your health information that will be used or disclosed as a result of you signing this authorization could be re-disclosed by the recipient. If this occurs, your re-disclosed health information may no longer be protected by State or Federal laws.  * You may refuse to sign this Authorization. Your refusal will not affect your ability to obtain treatment.  * This Authorization becomes effective upon signing and will  on (date) __________.      If no date is indicated, this Authorization will  one (1) year from the signature date.    Name: Martin Gonzáles    Signature: Continuity of care   Date:     2021       PLEASE FAX REQUESTED RECORDS  BACK TO: [phone]

## 2021-03-03 DIAGNOSIS — Z23 NEED FOR VACCINATION: ICD-10-CM

## 2021-07-20 ENCOUNTER — OFFICE VISIT (OUTPATIENT)
Dept: MEDICAL GROUP | Facility: PHYSICIAN GROUP | Age: 66
End: 2021-07-20
Payer: MEDICARE

## 2021-07-20 ENCOUNTER — HOSPITAL ENCOUNTER (OUTPATIENT)
Dept: LAB | Facility: MEDICAL CENTER | Age: 66
End: 2021-07-20
Attending: NURSE PRACTITIONER
Payer: MEDICARE

## 2021-07-20 VITALS
BODY MASS INDEX: 20.66 KG/M2 | DIASTOLIC BLOOD PRESSURE: 78 MMHG | SYSTOLIC BLOOD PRESSURE: 122 MMHG | WEIGHT: 121 LBS | HEIGHT: 64 IN | RESPIRATION RATE: 14 BRPM | TEMPERATURE: 98.6 F | OXYGEN SATURATION: 98 % | HEART RATE: 78 BPM

## 2021-07-20 DIAGNOSIS — R41.3 MEMORY IMPAIRMENT: Primary | ICD-10-CM

## 2021-07-20 DIAGNOSIS — R41.3 MEMORY IMPAIRMENT: ICD-10-CM

## 2021-07-20 LAB
FERRITIN SERPL-MCNC: 95.9 NG/ML (ref 10–291)
FOLATE SERPL-MCNC: 15.3 NG/ML
IRON SATN MFR SERPL: 35 % (ref 15–55)
IRON SERPL-MCNC: 102 UG/DL (ref 40–170)
TIBC SERPL-MCNC: 288 UG/DL (ref 250–450)
TSH SERPL DL<=0.005 MIU/L-ACNC: 1.16 UIU/ML (ref 0.38–5.33)
UIBC SERPL-MCNC: 186 UG/DL (ref 110–370)
VIT B12 SERPL-MCNC: 586 PG/ML (ref 211–911)

## 2021-07-20 PROCEDURE — 82607 VITAMIN B-12: CPT

## 2021-07-20 PROCEDURE — 82746 ASSAY OF FOLIC ACID SERUM: CPT

## 2021-07-20 PROCEDURE — 83550 IRON BINDING TEST: CPT

## 2021-07-20 PROCEDURE — 82728 ASSAY OF FERRITIN: CPT

## 2021-07-20 PROCEDURE — 36415 COLL VENOUS BLD VENIPUNCTURE: CPT

## 2021-07-20 PROCEDURE — 84443 ASSAY THYROID STIM HORMONE: CPT

## 2021-07-20 PROCEDURE — 99213 OFFICE O/P EST LOW 20 MIN: CPT | Performed by: NURSE PRACTITIONER

## 2021-07-20 PROCEDURE — 83540 ASSAY OF IRON: CPT

## 2021-07-20 PROCEDURE — 84425 ASSAY OF VITAMIN B-1: CPT

## 2021-07-20 ASSESSMENT — FIBROSIS 4 INDEX: FIB4 SCORE: 1.8

## 2021-07-20 NOTE — PROGRESS NOTES
"Subjective:     CC: Memory impairment.    HPI:   Monique presents today with continued mild memory impairment.  Since our last visit, she has undergone neuropsychological testing at [facility].  It was recommended that she establish care for further diagnostic testing with neurology as well as complete more comprehensive laboratory workup.  Patient and her  report that patient's symptoms are consistent with our first appointment in January 2021.  They have no other complaints today.     No past medical history on file.    Social History     Tobacco Use   • Smoking status: Never Smoker   • Smokeless tobacco: Never Used   Vaping Use   • Vaping Use: Never used   Substance Use Topics   • Alcohol use: Yes     Alcohol/week: 3.0 oz     Types: 5 Glasses of wine per week     Comment: occ   • Drug use: No       Current Outpatient Medications Ordered in Epic   Medication Sig Dispense Refill   • loratadine (CLARITIN) 10 MG Tab Take 10 mg by mouth 1 time a day as needed.       No current Epic-ordered facility-administered medications on file.       Allergies:  Seasonal and Aspirin    Health Maintenance: Completed    ROS:  Gen: no fevers/chills, no changes in weight  Eyes: no changes in vision  ENT: no sore throat, no hearing loss, no bloody nose  Pulm: no sob, no cough  CV: no chest pain, no palpitations  GI: no nausea/vomiting, no diarrhea  : no dysuria  MSk: no myalgias  Skin: no rash  Neuro: no headaches, no numbness/tingling  Heme/Lymph: no easy bruising      Objective:       Exam:  /78   Pulse 78   Temp 37 °C (98.6 °F)   Resp 14   Ht 1.626 m (5' 4\")   Wt 54.9 kg (121 lb)   SpO2 98%   BMI 20.77 kg/m²  Body mass index is 20.77 kg/m².    Gen: Alert and oriented, No apparent distress.  Neck: Neck is supple without lymphadenopathy.  No carotid bruits.  Lungs: Normal effort, CTA bilaterally, no wheezes, rhonchi, or rales  CV: Regular rate and rhythm. No murmurs, rubs, or gallops.  Ext: No " "clubbing, cyanosis, edema.    Labs: None.    Assessment & Plan:     65 y.o. female with the following -     1. Memory impairment  Patient presents with her  today.  They did undergo neuropsychological testing done by NISA Pelaez at [facility].  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  Patient was recommended to follow up with PCP for additional lab work and referral to neurology for further imaging and workup as necessary.  Patient reports that she feels that her symptoms are similar since last seeing me in January, and that she has not had any decline in her memory since that time.  Discussed referral to neurology and additional laboratory testing as indicated.  Patient and her  are agreeable to this plan.    - VITAMIN B12; Future  - TSH WITH REFLEX TO FT4; Future  - VITAMIN B1; Future  - FERRITIN; Future  - FOLATE; Future  - IRON/TOTAL IRON BIND; Future  - REFERRAL TO NEUROLOGY    Patient will follow up after she establishes with neurology.     I have placed the below orders and discussed them with an approved delegating provider.  The MA is performing the below orders under the direction of Felicity Villanueva MD.    Please note that this dictation was created using voice recognition software. I have made every reasonable attempt to correct obvious errors, but I expect that there are errors of grammar and possibly content that I did not discover before finalizing the note.  "

## 2021-07-20 NOTE — ASSESSMENT & PLAN NOTE
"Patient presents with her  today.  They did undergo neuropsychological testing done by NISA Pelaez at [facility].  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  Patient was recommended to follow up with PCP for additional lab work and referral to neurology for further imaging and workup as necessary.  Patient reports that she feels that her symptoms are similar since last seeing me in January, and that she has not had any decline in her memory since that time.  Discussed referral to neurology and additional laboratory testing as indicated.  Patient and her  are agreeable to this plan.    "

## 2021-07-20 NOTE — PROGRESS NOTES
Annual Health Assessment Questions:    1.  Are you currently engaging in any exercise or physical activity? Yes    2.  How would you describe your mood or emotional well-being today? emotional    3.  Have you had any falls in the last year? No    4.  Have you noticed any problems with your balance or had difficulty walking? No    5.  In the last six months have you experienced any leakage of urine? Yes    6. DPA/Advanced Directive: Patient has Advanced Directive on file.

## 2021-07-25 LAB — VIT B1 BLD-MCNC: 106 NMOL/L (ref 70–180)

## 2021-08-30 ENCOUNTER — PATIENT MESSAGE (OUTPATIENT)
Dept: HEALTH INFORMATION MANAGEMENT | Facility: OTHER | Age: 66
End: 2021-08-30

## 2021-09-16 ENCOUNTER — OFFICE VISIT (OUTPATIENT)
Dept: NEUROLOGY | Facility: MEDICAL CENTER | Age: 66
End: 2021-09-16
Attending: PSYCHIATRY & NEUROLOGY
Payer: MEDICARE

## 2021-09-16 VITALS
DIASTOLIC BLOOD PRESSURE: 84 MMHG | HEART RATE: 89 BPM | TEMPERATURE: 97.8 F | WEIGHT: 121.69 LBS | BODY MASS INDEX: 20.78 KG/M2 | HEIGHT: 64 IN | SYSTOLIC BLOOD PRESSURE: 112 MMHG | OXYGEN SATURATION: 98 %

## 2021-09-16 DIAGNOSIS — G30.9 ALZHEIMER'S DISEASE, UNSPECIFIED (CODE) (HCC): ICD-10-CM

## 2021-09-16 DIAGNOSIS — R41.3 MEMORY IMPAIRMENT: Primary | ICD-10-CM

## 2021-09-16 PROCEDURE — 99211 OFF/OP EST MAY X REQ PHY/QHP: CPT | Performed by: PSYCHIATRY & NEUROLOGY

## 2021-09-16 PROCEDURE — 99205 OFFICE O/P NEW HI 60 MIN: CPT | Performed by: PSYCHIATRY & NEUROLOGY

## 2021-09-16 RX ORDER — DONEPEZIL HYDROCHLORIDE 5 MG/1
5 TABLET, FILM COATED ORAL EVERY EVENING
Qty: 30 TABLET | Refills: 0 | Status: SHIPPED | OUTPATIENT
Start: 2021-09-16 | End: 2021-11-23

## 2021-09-16 RX ORDER — DONEPEZIL HYDROCHLORIDE 10 MG/1
10 TABLET, FILM COATED ORAL EVERY EVENING
Qty: 30 TABLET | Refills: 6 | Status: SHIPPED | OUTPATIENT
Start: 2021-09-16 | End: 2021-10-08

## 2021-09-16 ASSESSMENT — ENCOUNTER SYMPTOMS
TREMORS: 0
INSOMNIA: 0
SEIZURES: 1
SPEECH CHANGE: 0
FALLS: 0
LOSS OF CONSCIOUSNESS: 0
FOCAL WEAKNESS: 0
MEMORY LOSS: 1
WEAKNESS: 0

## 2021-09-16 ASSESSMENT — FIBROSIS 4 INDEX: FIB4 SCORE: 1.8

## 2021-09-16 NOTE — PROGRESS NOTES
Subjective     Monique Ortiz is a 65 y.o. female who presents with her  Tate, for consultation from the office of GEENA Silverman, with a history of symptoms suggestive of progressive cognitive impairment, and neuropsychological testing consistent with early dementia, possibly Alzheimer's disease.  The history is gotten from discussions with the patient but also with her  as well as review of the electronic health record.     YARY Amaya (she prefers Angelita) is very pleasant 65-year-old right-handed woman who recognizes that there are some memory issues and thinking problems though they do not seem to be overwhelming to her.  According to Tate, over the last couple of years there has been noticed and meaningful deterioration.  He does have to remind her frequently about similar events, she even will repeat herself about the singular process on multiple occasions, in rapid succession.  She is writing things down much more often, if not it is completely lost.  She is having difficulty following sequential tasks and completing them appropriately.  She is much slower in thinking in general.    Language is slower, she seemed to have difficulty finding words and expressing herself.  She more often than not is now deferring to her  when asked questions.  Multitasking is becoming more difficult.  Her home organization is being maintained, she is independent with her ADLs.  There has been no major change in bowel or bladder function.  She walks in steady fashion.  Appetite and weight are stable.    Her personality has changed slightly.  Her daughter has noted that her mother has become a bit more withdrawn and quiet, her daughter has also noted the cognitive symptoms described above.    As part of the work-up, she was forwarded to a neuropsychologist, Dr. JIM Gonzalez, PhD, I reviewed his report, revealing clear deficits in several cognitive domains going beyond her simple memory encoding and  association.  He noted significant language as well as executive and visuospatial deficits, etc.  His impressions that the deficits were consistent with mild dementia, likely early Alzheimer's disease.  Though depressive symptomatologies were described, he felt that these were secondary, not the primary cause for the cognitive symptoms themselves.  He recommended neurologic evaluation.    In the interim, B12, folate, thiamine, TSH, as well as routine CBC and CMP have all been checked and found to be unremarkable/nondiagnostic.  Imaging has yet to be performed.  She now presents.    She denies any symptoms suggestive of EPS dysfunction, anosmia, orthostatic syncope, etc.  She has had recurrent events of syncope in the past, she also had 1 recently.  Each of these involve significant exposure to heat with dehydration, having missed a few meals, etc.  She does awaken eventually, this last event actually required some chest compressions and resuscitation.    Medical history is completely negative.  There is nothing to suggest CVA, MS, seizure, neurodegenerative disease, diabetes, hypertension, arrhythmia, CAD, PVD, autoimmune disease, psychiatric disease, liver or kidney disease, pulmonary disease, or dyslipidemia.  There is no surgical history of note.    In the family, there is a history of rather significant depression through several generations on her father side.  There is also a history of heart disease with her brother.  No one in the family has a history of diagnosed neurodegenerative disease, specifically dementia.  She has 3 children, her daughters Shannon and Lidia, but interestingly, she had forgotten her son Akash, who evidently has some real problems with alcohol and MSA.    She will have a glass of wine every evening, does not smoke.  She has a high school level education.  She is a retired .  She takes Claritin.    Review of Systems   Constitutional: Negative for malaise/fatigue.   Genitourinary:  "Positive for urgency.   Musculoskeletal: Negative for falls.   Neurological: Positive for seizures. Negative for tremors, speech change, focal weakness, loss of consciousness and weakness.   Psychiatric/Behavioral: Positive for memory loss. The patient does not have insomnia.    All other systems reviewed and are negative.    Objective     /84 (BP Location: Left arm, Patient Position: Sitting, BP Cuff Size: Adult)   Pulse 89   Temp 36.6 °C (97.8 °F) (Temporal)   Ht 1.626 m (5' 4\")   Wt 55.2 kg (121 lb 11.1 oz)   SpO2 98%   BMI 20.89 kg/m²      Physical Exam    She appears in no acute distress.  She is clean and appropriately dressed.  She is cooperative.  She interacts easily with the examiner, she does have a tendency to look at Tate when she is asked questions.  She also has a tendency to simply give up stating \"I cannot do this\" whenever she has some difficulty performing her requested task.  Vital signs are stable.  There is no malar rash or jaw claudication.  There is no temporal or jaw tenderness.  Her neck is supple, range of motion is full.  There is no meningismus.  Cardiac evaluation reveals a regular rhythm.  There is no lower extremity edema.     Neurological Exam    She is only partially oriented, she knows the day of the week as well as our location.  MoCA is 10/30, language clearly affecting the results of several testing scores.  She is perseverative.  She requires several reminders even with simple tasks such as finger-to-nose completion.  She also is field dependent.  Language is also notably distorted.  She has word finding difficulties, fluency diminished.  She does become disheartened and gives up solving problems during the examination quite easily.  Frontal release signs are absent.  There is no rostrocaudal extinction.    PERRLA/EOMI, visual fields are full to movement detection and confrontation.  Funduscopic exam reveals sharp disc margins.  Facial movements are symmetric, there " is no hypophonia or dysarthria.  The tongue and uvula are midline.  Shoulder shrug and head rotation are intact and symmetric.  Sensory exam is intact to temperature and light touch bilaterally.    Musculoskeletal exam shows normal tone without tremor, asterixis, or drift.  Strength is 5/5 in all muscle groups in all 4 extremities.  Reflexes are present throughout, there are no asymmetries, none are dropped.  Both toes are downgoing.    She stands easily, armswing is symmetric, heel, toe, and tandem walking are normal.  Stride length is maintained.  She is not shuffling.  There is no appendicular dystaxia with any of the extremities.  Repetitive movements with fine motor control are also normal with maintained amplitude and frequencies bilaterally.    Sensory exam is intact to vibration and temperature, Romberg is absent.    Assessment & Plan     1. Memory impairment  I agree that this woman does have dementia, and it is rather significant, especially given her level of education.  The deficits spanned beyond simple delayed recall with memory encoding and association.  The work-up should be completed with MRI imaging of the brain and CT PET scan.  The rationale for this was reviewed.  Medication should be started, I explained to Tate that the treatments are symptomatically based, and how this differs from treating an underlying condition that is causing her dementia.  Testing is looking for treatable causes of dementia, other secondary types of dementia.  Still, there is no singular diagnostic test that can be used to identify a degenerative disease which I suspect that she has.    We will talk at great length once the work-up is complete, but I would like to start medication sooner rather than later.  Aricept 5 mg every evening for 1 month and then we will increase to 10 mg.  Side effects were reviewed.  We will communicate via [employer], eventually Namenda will be added.  We will follow-up in about 3 to 4  months.    - MR-BRAIN-W/O; Future  - CT-PET METABOLIC EVALUATION - BRAIN; Future  - donepezil (ARICEPT) 5 MG Tab; Take 1 Tablet by mouth every evening.  Dispense: 30 Tablet; Refill: 0  - donepezil (ARICEPT) 10 MG tablet; Take 1 Tablet by mouth every evening.  Dispense: 30 Tablet; Refill: 6    Time: 60 minutes was spent face-to-face for exam, review, discussion, and education, of this over 50% of the time spent counseling and coordinating care.

## 2021-10-12 ENCOUNTER — HOSPITAL ENCOUNTER (OUTPATIENT)
Dept: RADIOLOGY | Facility: MEDICAL CENTER | Age: 66
End: 2021-10-12
Attending: PSYCHIATRY & NEUROLOGY
Payer: MEDICARE

## 2021-10-12 DIAGNOSIS — G30.9 ALZHEIMER'S DISEASE, UNSPECIFIED (CODE) (HCC): ICD-10-CM

## 2021-10-12 DIAGNOSIS — R41.3 MEMORY IMPAIRMENT: ICD-10-CM

## 2021-10-12 PROCEDURE — 70551 MRI BRAIN STEM W/O DYE: CPT | Mod: MG

## 2021-10-12 PROCEDURE — A9552 F18 FDG: HCPCS

## 2021-10-28 ENCOUNTER — TELEPHONE (OUTPATIENT)
Dept: NEUROLOGY | Facility: MEDICAL CENTER | Age: 66
End: 2021-10-28

## 2021-10-29 NOTE — TELEPHONE ENCOUNTER
Let the patient's  know that the MRI scan of the patient's brain looks great.  There was nothing seen that would suggest a cause for the memory problems she is suffering from.  I am pleased about this.

## 2021-11-08 ENCOUNTER — NON-PROVIDER VISIT (OUTPATIENT)
Dept: MEDICAL GROUP | Facility: PHYSICIAN GROUP | Age: 66
End: 2021-11-08
Payer: MEDICARE

## 2021-11-08 DIAGNOSIS — Z23 NEED FOR VACCINATION: ICD-10-CM

## 2021-11-08 PROCEDURE — 90662 IIV NO PRSV INCREASED AG IM: CPT | Performed by: INTERNAL MEDICINE

## 2021-11-08 PROCEDURE — G0008 ADMIN INFLUENZA VIRUS VAC: HCPCS | Performed by: INTERNAL MEDICINE

## 2021-11-08 PROCEDURE — G0009 ADMIN PNEUMOCOCCAL VACCINE: HCPCS | Performed by: INTERNAL MEDICINE

## 2021-11-08 PROCEDURE — 90732 PPSV23 VACC 2 YRS+ SUBQ/IM: CPT | Performed by: INTERNAL MEDICINE

## 2021-11-08 NOTE — PROGRESS NOTES
"Monique Ortiz is a 65 y.o. female here for a non-provider visit for:   FLU    Reason for immunization: Annual Flu Vaccine  Immunization records indicate need for vaccine: Yes, confirmed with Epic  Minimum interval has been met for this vaccine: Yes  ABN completed: Not Indicated    VIS Dated  8/6/21 was given to patient: Yes  All IAC Questionnaire questions were answered \"No.\"    Patient tolerated injection and no adverse effects were observed or reported: Yes    Pt scheduled for next dose in series: Not Indicated    "

## 2021-11-08 NOTE — PROGRESS NOTES
"Monique Ortiz is a 65 y.o. female here for a non-provider visit for:   PNEUMOVAX (PPSV23)    Reason for immunization: Overdue/Provider Recommended  Immunization records indicate need for vaccine: Yes, confirmed with Epic  Minimum interval has been met for this vaccine: Yes  ABN completed: Not Indicated    VIS Dated  10/30/2019 was given to patient: Yes  All IAC Questionnaire questions were answered \"No.\"    Patient tolerated injection and no adverse effects were observed or reported: Yes    Pt scheduled for next dose in series: Not Indicated    "

## 2021-11-23 ENCOUNTER — OFFICE VISIT (OUTPATIENT)
Dept: NEUROLOGY | Facility: MEDICAL CENTER | Age: 66
End: 2021-11-23
Attending: PSYCHIATRY & NEUROLOGY
Payer: MEDICARE

## 2021-11-23 VITALS
TEMPERATURE: 97.8 F | DIASTOLIC BLOOD PRESSURE: 82 MMHG | WEIGHT: 126.98 LBS | HEART RATE: 68 BPM | SYSTOLIC BLOOD PRESSURE: 110 MMHG | OXYGEN SATURATION: 96 % | HEIGHT: 64 IN | BODY MASS INDEX: 21.68 KG/M2

## 2021-11-23 DIAGNOSIS — R41.3 MEMORY IMPAIRMENT: ICD-10-CM

## 2021-11-23 PROBLEM — J32.9 CHRONIC SINUSITIS: Status: ACTIVE | Noted: 2020-03-18

## 2021-11-23 PROBLEM — G30.1 LATE ONSET ALZHEIMER'S DEMENTIA WITHOUT BEHAVIORAL DISTURBANCE (HCC): Status: ACTIVE | Noted: 2020-08-12

## 2021-11-23 PROBLEM — F02.80 LATE ONSET ALZHEIMER'S DEMENTIA WITHOUT BEHAVIORAL DISTURBANCE (HCC): Status: ACTIVE | Noted: 2020-08-12

## 2021-11-23 PROCEDURE — 99214 OFFICE O/P EST MOD 30 MIN: CPT | Performed by: PSYCHIATRY & NEUROLOGY

## 2021-11-23 PROCEDURE — 99211 OFF/OP EST MAY X REQ PHY/QHP: CPT | Performed by: PSYCHIATRY & NEUROLOGY

## 2021-11-23 RX ORDER — DONEPEZIL HYDROCHLORIDE 10 MG/1
10 TABLET, FILM COATED ORAL EVERY EVENING
Qty: 90 TABLET | Refills: 3 | Status: SHIPPED | OUTPATIENT
Start: 2021-11-23 | End: 2022-12-15

## 2021-11-23 ASSESSMENT — ENCOUNTER SYMPTOMS: MEMORY LOSS: 1

## 2021-11-23 ASSESSMENT — FIBROSIS 4 INDEX: FIB4 SCORE: 1.83

## 2021-11-23 NOTE — PROGRESS NOTES
Subjective     Monique Ortiz is a 66 y.o. female who presents with her  Tate, for follow-up, last seen in September, 2021, having finished her work-up for complaints of memory loss and cognitive impairment.  His result is gotten from Tate.    YARY Amaya (Angelita) states she is doing well.  She acknowledges that she is not really aware of anything is being wrong despite others asertions.  She feels she is not anxious, actually she feels quite happy.    She is on Aricept 10 mg every evening, between the 2 of them she does take the medication regularly.  She is not aware of any changes in her symptoms or personality, Tate feels that she is a little less anxious and a bit more calm.  She denies any major GI disturbances.    She is still getting out regularly, she is looking forward to the holidays where her daughter will be coming home to visit.  There has been no major change in their home routines.  The routine repetitions about daily events are still there, frequent reminders always required, and even then recall is incomplete.  Slight reduction in language fluency is unchanged.  She still tends to defer to Tate when in conversation.  She is still independent with her ADLs, there have been no problems with balance or of urine bowel control.  She is sleeping well.  Appetite is good, she still enjoys a single glass of wine every evening.    MRI the brain revealed a slight degree of advanced but still generalized atrophy only.  CT PET scan did reveal more focal hypometabolism bilaterally in the temporal and parietal lobes, consistent with Alzheimer's disease.    Medical, surgical and family histories are reviewed, it is her father who suffered from cognitive symptoms, dying in a care facility because of her mother's inability to care for him.    She is on Aricept 10 mg every evening.    Review of Systems   Psychiatric/Behavioral: Positive for memory loss.   All other systems reviewed and are  "negative.    Objective     /82   Pulse 68   Temp 36.6 °C (97.8 °F)   Ht 1.626 m (5' 4\")   Wt 57.6 kg (126 lb 15.8 oz)   SpO2 96%   BMI 21.80 kg/m²      Physical Exam    She appears in no acute distress.  She is still very pleasant in spirit and demeanor, very cooperative.  She maintains good eye contact, but throughout the interview she again tends to defer to her  when questions are asked of her.  She is clearly very self-conscious about the difficulty she is having.  Vital signs are stable.  There is no malar rash.  Her neck is supple.  Cardiac evaluation reveals a regular rhythm.     Neurological Exam    She still has difficulty with orientation, she recognizes the month, states the year, she knows the day of the week but not the date itself.  She needs a reminder to recall that we are coming up on Thanksgiving.  She has difficulty recalling her address, she knows her date of birth but incorrectly states her age.  Throughout the interview she is quite redundant, repeats herself.  There seems to be little insight as to doing this.  Her mood is euthymic, she does become appropriately emotional at times when talking about the problems she is having.    Otherwise, in quick and cursory fashion, cranial nerve, musculoskeletal and coordination evaluations are all intact.    Assessment & Plan     1. Memory impairment  Given the findings on nuclear study, I suspect this woman does have Alzheimer's disease as a cause of her dementia.  We talked about all of this at length including, again, the nature of dementia, the illnesses that can cause this process, and why I suspect her diagnosis.  She was also informed of what this is not due to, illnesses that can progress any more rapid and ugly pace.  They were also told that to truly diagnose the condition would require brain biopsy which is typically only done postmortem.  They were told that this is a new onset progresses very slowly over many years, and " the more acute or subacute change suggests something else underlying in needs to be addressed more quickly.  At the same time they were told she will have good and bad days, the fluctuations mild but she will still return to baseline.    Socially, she states that several family members are keenly aware of her diagnosis.  Evidently some good friends with whom they socialize regularly are as well.  At this time she is still competent, she was encouraged to remain active and decisions that are being made and which involve her intimately.  This is critical now as she was told competency will eventually be lost and this will fall to Tate.     We talked about medications available, symptomatically based treatment, though she may not recognize any difference, the medications allow levels of independence and functional status to be prolonged for additional intervals of time.  We will keep her on Aricept at this dose, though we can push to 23 mg moving forwards.  She was encouraged to remain both physically and cognitively active.  She has no sleep distortions, a single glass of wine every evening is quite fine, they eat a healthy diet, we talked about Mediterranean style diets, etc.    We will follow-up in about 8 months, and at that point I will repeat another MoCA test, much to her chagrin.    - donepezil (ARICEPT) 10 MG tablet; Take 1 Tablet by mouth every evening.  Dispense: 90 Tablet; Refill: 3    Time: 30 minutes in total spent on patient care including precharting, record review, discussions with healthcare staff and documentation.  This included face-to-face time with the patient for exam, review, education, counseling and coordinating care.

## 2022-01-27 ENCOUNTER — PATIENT MESSAGE (OUTPATIENT)
Dept: HEALTH INFORMATION MANAGEMENT | Facility: OTHER | Age: 67
End: 2022-01-27
Payer: MEDICARE

## 2022-05-05 ENCOUNTER — OFFICE VISIT (OUTPATIENT)
Dept: MEDICAL GROUP | Facility: PHYSICIAN GROUP | Age: 67
End: 2022-05-05
Payer: MEDICARE

## 2022-05-05 VITALS
WEIGHT: 136 LBS | TEMPERATURE: 97.3 F | HEART RATE: 70 BPM | OXYGEN SATURATION: 98 % | HEIGHT: 64 IN | RESPIRATION RATE: 16 BRPM | DIASTOLIC BLOOD PRESSURE: 60 MMHG | SYSTOLIC BLOOD PRESSURE: 112 MMHG | BODY MASS INDEX: 23.22 KG/M2

## 2022-05-05 DIAGNOSIS — R35.0 FREQUENCY OF URINATION: Primary | ICD-10-CM

## 2022-05-05 DIAGNOSIS — N30.01 ACUTE CYSTITIS WITH HEMATURIA: ICD-10-CM

## 2022-05-05 LAB
APPEARANCE UR: CLEAR
BILIRUB UR STRIP-MCNC: NEGATIVE MG/DL
COLOR UR AUTO: YELLOW
GLUCOSE UR STRIP.AUTO-MCNC: NEGATIVE MG/DL
KETONES UR STRIP.AUTO-MCNC: NEGATIVE MG/DL
LEUKOCYTE ESTERASE UR QL STRIP.AUTO: NORMAL
NITRITE UR QL STRIP.AUTO: NEGATIVE
PH UR STRIP.AUTO: 5.5 [PH] (ref 5–8)
PROT UR QL STRIP: NEGATIVE MG/DL
RBC UR QL AUTO: NORMAL
SP GR UR STRIP.AUTO: >=1.03
UROBILINOGEN UR STRIP-MCNC: NORMAL MG/DL

## 2022-05-05 PROCEDURE — 99214 OFFICE O/P EST MOD 30 MIN: CPT | Performed by: NURSE PRACTITIONER

## 2022-05-05 PROCEDURE — 81002 URINALYSIS NONAUTO W/O SCOPE: CPT | Performed by: NURSE PRACTITIONER

## 2022-05-05 RX ORDER — NITROFURANTOIN 25; 75 MG/1; MG/1
100 CAPSULE ORAL 2 TIMES DAILY
Qty: 10 CAPSULE | Refills: 0 | Status: SHIPPED | OUTPATIENT
Start: 2022-05-05 | End: 2022-05-10

## 2022-05-05 ASSESSMENT — PATIENT HEALTH QUESTIONNAIRE - PHQ9: CLINICAL INTERPRETATION OF PHQ2 SCORE: 0

## 2022-05-05 ASSESSMENT — FIBROSIS 4 INDEX: FIB4 SCORE: 1.83

## 2022-05-05 NOTE — PATIENT INSTRUCTIONS
[email or web address]        Pelvic Floor Dysfunction    Pelvic floor dysfunction (PFD) is a condition that results when the group of muscles and connective tissues that support the organs in the pelvis (pelvic floor muscles) do not work well. These muscles and their connections form a sling that supports the colon and bladder. In men, these muscles also support the prostate gland. In women, they also support the uterus.  PFD causes pelvic floor muscles to be too weak, too tight, or a combination of both. In PFD, muscle movements are not coordinated. This condition may cause bowel or bladder problems. It may also cause pain.  What are the causes?  This condition may be caused by an injury to the pelvic area or by a weakening of pelvic muscles. This often results from pregnancy and childbirth or other types of strain. In many cases, the exact cause is not known.  What increases the risk?  The following factors may make you more likely to develop this condition:  · Having a condition of chronic bladder tissue inflammation (interstitial cystitis).  · Being an older person.  · Being overweight.  · Radiation treatment for cancer in the pelvic region.  · Previous pelvic surgery, such as removal of the uterus (hysterectomy) or prostate gland (prostatectomy).  What are the signs or symptoms?  Symptoms of this condition vary and may include:  · Bladder symptoms, such as:  ? Trouble starting urination and emptying the bladder.  ? Frequent urinary tract infections.  ? Leaking urine when coughing, laughing, or exercising (stress incontinence).  ? Having to pass urine urgently or frequently.  ? Pain when passing urine.  · Bowel symptoms, such as:  ? Constipation.  ? Urgent or frequent bowel movements.  ? Incomplete bowel movements.  ? Painful bowel movements.  ? Leaking stool or gas.  · Unexplained genital or rectal pain.  · Genital or rectal muscle spasms.  · Low back  pain.  In women, symptoms of PFD may also include:  · A heavy, full, or aching feeling in the vagina.  · A bulge that protrudes into the vagina.  · Pain during or after sexual intercourse.  How is this diagnosed?  This condition may be diagnosed based on:  · Your symptoms and medical history.  · A physical exam. During the exam, your health care provider may check your pelvic muscles for tightness, spasm, pain, or weakness. This may include a rectal exam and a pelvic exam for women.  In some cases, you may have diagnostic tests, such as:  · Electrical muscle function tests.  · Urine flow testing.  · X-ray tests of bowel function.  · Ultrasound of the pelvic organs.  How is this treated?  Treatment for this condition depends on your symptoms. Treatment options include:  · Physical therapy. This may include Kegel exercises to help relax or strengthen the pelvic floor muscles.  · Biofeedback. This type of therapy provides feedback on how tight your pelvic floor muscles are so that you can learn to control them.  · Internal or external massage therapy.  · A treatment that involves electrical stimulation of the pelvic floor muscles to help control pain (transcutaneous electrical nerve stimulation, or TENS).  · Sound wave therapy (ultrasound) to reduce muscle spasms.  · Medicines, such as:  ? Muscle relaxants.  ? Bladder control medicines.  Surgery to reconstruct or support pelvic floor muscles may be an option if other treatments do not help.  Follow these instructions at home:  Activity  · Do your usual activities as told by your health care provider. Ask your health care provider if you should modify any activities.  · Do pelvic floor strengthening or relaxing exercises at home as told by your physical therapist.  Lifestyle  · Maintain a healthy weight.  · Eat foods that are high in fiber, such as beans, whole grains, and fresh fruits and vegetables.  · Limit foods that are high in fat and processed sugars, such as  fried or sweet foods.  · Manage stress with relaxation techniques such as yoga or meditation.  General instructions  · If you have problems with leakage:  ? Use absorbable pads or wear padded underwear.  ? Wash frequently with mild soap.  ? Keep your genital and anal area as clean and dry as possible.  ? Ask your health care provider if you should try a barrier cream to prevent skin irritation.  · Take warm baths to relieve pelvic muscle tension or spasms.  · Take over-the-counter and prescription medicines only as told by your health care provider.  · Keep all follow-up visits as told by your health care provider. This is important.  Contact a health care provider if you:  · Are not improving with home care.  · Have signs or symptoms of PFD that get worse at home.  · Develop new signs or symptoms at home.  · Have signs of a urinary tract infection, such as:  ? Fever.  ? Chills.  ? Urinary frequency.  ? A burning feeling when urinating.  · Have not had a bowel movement in 3 days (constipation).  Summary  · Pelvic floor dysfunction results when the muscles and connective tissues in your pelvic floor do not work well.  · These muscles and their connections form a sling that supports your colon and bladder. In men, these muscles also support the prostate gland. In women, they also support the uterus.  · PFD may be caused by an injury to the pelvic area or by a weakening of pelvic muscles.  · PFD causes pelvic floor muscles to be too weak, too tight, or a combination of both. Symptoms may vary from person to person.  · In most cases, PFD can be treated with physical therapies and medicines. Surgery may be an option if other treatments do not help.  This information is not intended to replace advice given to you by your health care provider. Make sure you discuss any questions you have with your health care provider.  Document Released: 07/08/2019 Document Revised: 07/08/2019 Document Reviewed: 07/08/2019  Jose David  Patient Education © 2020 [employer]

## 2022-05-06 NOTE — PROGRESS NOTES
"Subjective:     CC: The primary encounter diagnosis was Frequency of urination. A diagnosis of Acute cystitis with hematuria was also pertinent to this visit.      HPI:   Monique is an established patient of mine here for follow-up.  We discussed the following problems:    1. Frequency of urination  2. Acute cystitis with hematuria  Acute condition. Patient is here for evaluation today.       History reviewed. No pertinent past medical history.    Social History     Tobacco Use   • Smoking status: Never Smoker   • Smokeless tobacco: Never Used   Vaping Use   • Vaping Use: Never used   Substance Use Topics   • Alcohol use: Yes     Alcohol/week: 3.0 oz     Types: 5 Glasses of wine per week     Comment: occ   • Drug use: No       Current Outpatient Medications Ordered in Epic   Medication Sig Dispense Refill   • nitrofurantoin (MACROBID) 100 MG Cap Take 1 Capsule by mouth 2 times a day for 5 days. 10 Capsule 0   • donepezil (ARICEPT) 10 MG tablet Take 1 Tablet by mouth every evening. 90 Tablet 3     No current Epic-ordered facility-administered medications on file.       Allergies:  Seasonal and Aspirin    Health Maintenance: Deferred    ROS:  Gen: no fevers/chills, no changes in weight  Eyes: no changes in vision  ENT: no sore throat, no hearing loss, no bloody nose  Pulm: no sob, no cough  CV: no chest pain, no palpitations  GI: no nausea/vomiting, no diarrhea  : no dysuria, +frequency  MSk: no myalgias  Skin: no rash  Neuro: no headaches, no numbness/tingling  Heme/Lymph: no easy bruising      Objective:       Exam:  /60 (BP Location: Left arm, Patient Position: Sitting, BP Cuff Size: Adult)   Pulse 70   Temp 36.3 °C (97.3 °F) (Temporal)   Resp 16   Ht 1.626 m (5' 4\")   Wt 61.7 kg (136 lb)   SpO2 98%   BMI 23.34 kg/m²  Body mass index is 23.34 kg/m².    Gen: Alert and oriented, No apparent distress.  Neck: Neck is supple without lymphadenopathy.    Ext: No clubbing, cyanosis, edema.    Labs: Dated: " None    Assessment & Plan:     66 y.o. female with the following -     Frequency of urination  Acute condition, unstable. Patient reports that over the past month she has been noticing urinary frequency. She denies urgency, dysuria, flank pain, fever, chills or sweats.  She denies any incontinence. She has made lifestyle changes due to this, such as using the bathroom prior to leaving the house and immediately after returning.  Patient and her  question whether these symptoms could be due to side-effects from donepezil.  POCT UA in the office today was remarkable for trace leukocytes and trace blood. Discussed with patient and her  that based on this result, combined with her symptoms, I would like to treat her for a UTI.  Discussed that patient should have resolution of her symptoms with in 72 hours if this is the cause.  If she is not improved, they will try pelvic floor exercises to help (resources given), and they will also talk to Dr. Carty about the donepezil and whether this could be the culprit as well.  Patient will return in six to eight weeks to see how she is doing, and determine if further workup is needed.      Orders Placed This Encounter   • POCT Urinalysis   • nitrofurantoin (MACROBID) 100 MG Cap          Return in about 2 months (around 7/5/2022), or if symptoms worsen or fail to improve.    I have placed the below orders and discussed them with an approved delegating provider.  The MA is performing the below orders under the direction of Felicity Villanueva MD.    Please note that this dictation was created using voice recognition software. I have made every reasonable attempt to correct obvious errors, but I expect that there are errors of grammar and possibly content that I did not discover before finalizing the note.

## 2022-05-06 NOTE — ASSESSMENT & PLAN NOTE
Acute condition, unstable. Patient reports that over the past month she has been noticing urinary frequency. She denies urgency, dysuria, flank pain, fever, chills or sweats.  She denies any incontinence. She has made lifestyle changes due to this, such as using the bathroom prior to leaving the house and immediately after returning.  Patient and her  question whether these symptoms could be due to side-effects from donepezil.  POCT UA in the office today was remarkable for trace leukocytes and trace blood. Discussed with patient and her  that based on this result, combined with her symptoms, I would like to treat her for a UTI.  Discussed that patient should have resolution of her symptoms with in 72 hours if this is the cause.  If she is not improved, they will try pelvic floor exercises to help (resources given), and they will also talk to Dr. Carty about the donepezil and whether this could be the culprit as well.  Patient will return in six to eight weeks to see how she is doing, and determine if further workup is needed.

## 2022-05-19 ENCOUNTER — TELEPHONE (OUTPATIENT)
Dept: HEALTH INFORMATION MANAGEMENT | Facility: OTHER | Age: 67
End: 2022-05-19

## 2022-06-09 PROBLEM — G31.9 CEREBRAL ATROPHY (HCC): Status: ACTIVE | Noted: 2022-06-09

## 2022-06-09 PROBLEM — D69.6 THROMBOCYTOPENIA (HCC): Status: ACTIVE | Noted: 2022-06-09

## 2022-07-21 ENCOUNTER — OFFICE VISIT (OUTPATIENT)
Dept: MEDICAL GROUP | Facility: PHYSICIAN GROUP | Age: 67
End: 2022-07-21
Payer: MEDICARE

## 2022-07-21 VITALS
TEMPERATURE: 98.3 F | HEART RATE: 80 BPM | HEIGHT: 64 IN | RESPIRATION RATE: 16 BRPM | OXYGEN SATURATION: 99 % | WEIGHT: 137.7 LBS | SYSTOLIC BLOOD PRESSURE: 120 MMHG | DIASTOLIC BLOOD PRESSURE: 82 MMHG | BODY MASS INDEX: 23.51 KG/M2

## 2022-07-21 DIAGNOSIS — H92.03 EAR PAIN, BILATERAL: ICD-10-CM

## 2022-07-21 DIAGNOSIS — Z78.0 POSTMENOPAUSAL: ICD-10-CM

## 2022-07-21 DIAGNOSIS — N32.81 OVERACTIVE BLADDER: Primary | ICD-10-CM

## 2022-07-21 DIAGNOSIS — Z12.31 ENCOUNTER FOR SCREENING MAMMOGRAM FOR MALIGNANT NEOPLASM OF BREAST: ICD-10-CM

## 2022-07-21 PROBLEM — N30.01 ACUTE CYSTITIS WITH HEMATURIA: Status: RESOLVED | Noted: 2022-05-05 | Resolved: 2022-07-21

## 2022-07-21 PROCEDURE — 99214 OFFICE O/P EST MOD 30 MIN: CPT | Performed by: NURSE PRACTITIONER

## 2022-07-21 RX ORDER — VITAMIN B COMPLEX
1000 TABLET ORAL DAILY
COMMUNITY

## 2022-07-21 ASSESSMENT — FIBROSIS 4 INDEX: FIB4 SCORE: 1.83

## 2022-07-22 NOTE — PROGRESS NOTES
Subjective:     CC: The primary encounter diagnosis was Overactive bladder. Diagnoses of Ear pain, bilateral, Encounter for screening mammogram for malignant neoplasm of breast, and Postmenopausal were also pertinent to this visit.      HPI:   Monique is an established patient of [facility] here for follow-up.  We discussed the following problems:    1. Overactive bladder  Chronic condition, unstable. Patient is here for evaluation today.    2. Ear pain, bilateral  Chronic condition, unstable. Patient is here for evaluation today.    3. Encounter for screening mammogram for malignant neoplasm of breast  Screening mammogram ordered today.    4. Postmenopausal  Dexa scan ordered today.       History reviewed. No pertinent past medical history.    Social History     Tobacco Use   • Smoking status: Never Smoker   • Smokeless tobacco: Never Used   Vaping Use   • Vaping Use: Never used   Substance Use Topics   • Alcohol use: Yes     Alcohol/week: 3.0 oz     Types: 5 Glasses of wine per week     Comment: occ   • Drug use: No       Current Outpatient Medications Ordered in Epic   Medication Sig Dispense Refill   • vitamin D3 (CHOLECALCIFEROL) 1000 Unit (25 mcg) Tab Take 1,000 Units by mouth every day.     • Mirabegron ER 25 MG TABLET SR 24 HR Take 25 mg by mouth every day for 30 days. 30 Tablet 2   • donepezil (ARICEPT) 10 MG tablet Take 1 Tablet by mouth every evening. 90 Tablet 3     No current Epic-ordered facility-administered medications on file.       Allergies:  Seasonal and Aspirin    Health Maintenance: Completed    ROS:  Gen: no fevers/chills, no changes in weight  Eyes: no changes in vision  ENT: no sore throat, no hearing loss, no bloody nose  Pulm: no sob, no cough  CV: no chest pain, no palpitations  GI: no nausea/vomiting, no diarrhea  : no dysuria  MSk: no myalgias  Skin: no rash  Neuro: no headaches, no numbness/tingling  Heme/Lymph: no easy bruising      Objective:       Exam:  /82 (BP Location: Right  "arm, Patient Position: Sitting, BP Cuff Size: Small adult)   Pulse 80   Temp 36.8 °C (98.3 °F) (Temporal)   Resp 16   Ht 1.626 m (5' 4\")   Wt 62.5 kg (137 lb 11.2 oz)   SpO2 99%   BMI 23.64 kg/m²  Body mass index is 23.64 kg/m².    Gen: Alert and oriented, No apparent distress.  Neck: Neck is supple without lymphadenopathy.    Ears:   Bilateral ear canals are clear.  TMs are intact and pearly gray.    Ext: No clubbing, cyanosis, edema.    Labs: Dated: None    Assessment & Plan:     66 y.o. female with the following -     Overactive bladder  Chronic condition, uncontrolled.  Patient reports significant difficulty with frequent urge to urinate, often up to 10 times per hour. Patient reports that she does urinate a small amount each time. She states she does feel better after her UTI was treated at the last visit, however, these symptoms are different.  She states that this is very disruptive to her life.  She reports that she has tried pelvic floor exercises at home and declines referral to formal pelvic floor therapy at this time.  She is interested in trying Myrbetriq today.  She will start at 25 mg daily, and I will see her back in one month to see how she is doing.     Ear pain, bilateral  Chronic and ongoing.  Patient states that she has had pain in her bilateral ears which is intermittent in nature.  She states that the pain sometimes is a sharp, stabbing and lasts for several minutes and then it subsides.  She is wondering if she has something in her ears.  On exam today, her bilateral ears are clear.  There is mild cerumen noted.  Patient would like ear irrigation today, which was performed.  Patients bilateral ears were noted to be completely clear after the irrigation.  Patient will see if this helps. She will follow up in one month, and if she is still experiencing continued pain will refer to ENT for further evaluation.        Orders Placed This Encounter   • MA-SCREENING MAMMO BILAT W/TOMOSYNTHESIS " W/CAD   • DS-BONE DENSITY STUDY (DEXA)   • vitamin D3 (CHOLECALCIFEROL) 1000 Unit (25 mcg) Tab   • Mirabegron ER 25 MG TABLET SR 24 HR          Return in about 4 weeks (around 8/18/2022).    I have placed the below orders and discussed them with an approved delegating provider.  The MA is performing the below orders under the direction of Felicity Villanueva MD.    Please note that this dictation was created using voice recognition software. I have made every reasonable attempt to correct obvious errors, but I expect that there are errors of grammar and possibly content that I did not discover before finalizing the note.

## 2022-07-22 NOTE — ASSESSMENT & PLAN NOTE
Chronic and ongoing.  Patient states that she has had pain in her bilateral ears which is intermittent in nature.  She states that the pain sometimes is a sharp, stabbing and lasts for several minutes and then it subsides.  She is wondering if she has something in her ears.  On exam today, her bilateral ears are clear.  There is mild cerumen noted.  Patient would like ear irrigation today, which was performed.  Patients bilateral ears were noted to be completely clear after the irrigation.  Patient will see if this helps. She will follow up in one month, and if she is still experiencing continued pain will refer to ENT for further evaluation.

## 2022-07-22 NOTE — ASSESSMENT & PLAN NOTE
Chronic condition, uncontrolled.  Patient reports significant difficulty with frequent urge to urinate, often up to 10 times per hour. Patient reports that she does urinate a small amount each time. She states she does feel better after her UTI was treated at the last visit, however, these symptoms are different.  She states that this is very disruptive to her life.  She reports that she has tried pelvic floor exercises at home and declines referral to formal pelvic floor therapy at this time.  She is interested in trying Myrbetriq today.  She will start at 25 mg daily, and I will see her back in one month to see how she is doing.

## 2022-08-25 ENCOUNTER — OFFICE VISIT (OUTPATIENT)
Dept: MEDICAL GROUP | Facility: PHYSICIAN GROUP | Age: 67
End: 2022-08-25
Payer: MEDICARE

## 2022-08-25 VITALS
OXYGEN SATURATION: 99 % | TEMPERATURE: 98.9 F | HEART RATE: 67 BPM | RESPIRATION RATE: 16 BRPM | SYSTOLIC BLOOD PRESSURE: 110 MMHG | HEIGHT: 64 IN | DIASTOLIC BLOOD PRESSURE: 76 MMHG | WEIGHT: 138.5 LBS | BODY MASS INDEX: 23.64 KG/M2

## 2022-08-25 DIAGNOSIS — N32.81 OVERACTIVE BLADDER: Primary | ICD-10-CM

## 2022-08-25 PROCEDURE — 99213 OFFICE O/P EST LOW 20 MIN: CPT | Performed by: NURSE PRACTITIONER

## 2022-08-27 NOTE — ASSESSMENT & PLAN NOTE
Chronic condition, stable.  Patient states that she feels improved on the mirabegron 25.  She would like to increase the dose today.  Patient was given mirabegron 50 mg daily today. She will let me know if her symptoms return.  She will follow up with me as needed.

## 2022-08-27 NOTE — PROGRESS NOTES
"Subjective:     CC: The encounter diagnosis was Overactive bladder.      HPI:   Monique is an established patient of mine here for follow-up.  We discussed the following problems:    1. Overactive bladder  Chronic condition, improved.  Patient is here for follow up.       History reviewed. No pertinent past medical history.    Social History     Tobacco Use    Smoking status: Never    Smokeless tobacco: Never   Vaping Use    Vaping Use: Never used   Substance Use Topics    Alcohol use: Yes     Alcohol/week: 3.0 oz     Types: 5 Glasses of wine per week     Comment: occ    Drug use: No       Current Outpatient Medications Ordered in Epic   Medication Sig Dispense Refill    Mirabegron ER 50 MG TABLET SR 24 HR Take 50 mg by mouth every day. 100 Tablet 3    vitamin D3 (CHOLECALCIFEROL) 1000 Unit (25 mcg) Tab Take 1,000 Units by mouth every day.      donepezil (ARICEPT) 10 MG tablet Take 1 Tablet by mouth every evening. 90 Tablet 3     No current Epic-ordered facility-administered medications on file.       Allergies:  Seasonal and Aspirin    Health Maintenance: Completed    ROS:  Gen: no fevers/chills, no changes in weight  Eyes: no changes in vision  ENT: no sore throat, no hearing loss, no bloody nose  Pulm: no sob, no cough  CV: no chest pain, no palpitations  GI: no nausea/vomiting, no diarrhea  : no dysuria  MSk: no myalgias  Skin: no rash  Neuro: no headaches, no numbness/tingling  Heme/Lymph: no easy bruising      Objective:       Exam:  /76 (BP Location: Left arm, Patient Position: Sitting, BP Cuff Size: Small adult)   Pulse 67   Temp 37.2 °C (98.9 °F) (Temporal)   Resp 16   Ht 1.626 m (5' 4\")   Wt 62.8 kg (138 lb 8 oz)   SpO2 99%   BMI 23.77 kg/m²  Body mass index is 23.77 kg/m².    Gen: Alert and oriented, No apparent distress.  Neck: Neck is supple without lymphadenopathy.  Ext: No clubbing, cyanosis, edema.    Labs: Dated: None    Assessment & Plan:     66 y.o. female with the following - "     Overactive bladder  Chronic condition, stable.  Patient states that she feels improved on the mirabegron 25.  She would like to increase the dose today.  Patient was given mirabegron 50 mg daily today. She will let me know if her symptoms return.  She will follow up with me as needed.     Orders Placed This Encounter    Mirabegron ER 50 MG TABLET SR 24 HR          Return if symptoms worsen or fail to improve.    I have placed the below orders and discussed them with an approved delegating provider.  The MA is performing the below orders under the direction of Felicity Villanueva MD.    Please note that this dictation was created using voice recognition software. I have made every reasonable attempt to correct obvious errors, but I expect that there are errors of grammar and possibly content that I did not discover before finalizing the note.

## 2022-09-01 ENCOUNTER — HOSPITAL ENCOUNTER (OUTPATIENT)
Dept: RADIOLOGY | Facility: MEDICAL CENTER | Age: 67
End: 2022-09-01
Attending: NURSE PRACTITIONER
Payer: MEDICARE

## 2022-09-01 ENCOUNTER — OFFICE VISIT (OUTPATIENT)
Dept: NEUROLOGY | Facility: MEDICAL CENTER | Age: 67
End: 2022-09-01
Attending: PSYCHIATRY & NEUROLOGY
Payer: MEDICARE

## 2022-09-01 VITALS
WEIGHT: 139.99 LBS | OXYGEN SATURATION: 96 % | DIASTOLIC BLOOD PRESSURE: 70 MMHG | TEMPERATURE: 98.7 F | HEIGHT: 64 IN | BODY MASS INDEX: 23.9 KG/M2 | SYSTOLIC BLOOD PRESSURE: 116 MMHG | HEART RATE: 72 BPM | RESPIRATION RATE: 15 BRPM

## 2022-09-01 DIAGNOSIS — G30.1 LATE ONSET ALZHEIMER'S DEMENTIA WITHOUT BEHAVIORAL DISTURBANCE (HCC): Primary | ICD-10-CM

## 2022-09-01 DIAGNOSIS — Z78.0 POSTMENOPAUSAL: ICD-10-CM

## 2022-09-01 DIAGNOSIS — F02.80 LATE ONSET ALZHEIMER'S DEMENTIA WITHOUT BEHAVIORAL DISTURBANCE (HCC): Primary | ICD-10-CM

## 2022-09-01 DIAGNOSIS — Z12.31 ENCOUNTER FOR SCREENING MAMMOGRAM FOR MALIGNANT NEOPLASM OF BREAST: ICD-10-CM

## 2022-09-01 PROCEDURE — 99211 OFF/OP EST MAY X REQ PHY/QHP: CPT | Performed by: PSYCHIATRY & NEUROLOGY

## 2022-09-01 PROCEDURE — 99215 OFFICE O/P EST HI 40 MIN: CPT | Performed by: PSYCHIATRY & NEUROLOGY

## 2022-09-01 PROCEDURE — 77063 BREAST TOMOSYNTHESIS BI: CPT

## 2022-09-01 PROCEDURE — 77080 DXA BONE DENSITY AXIAL: CPT

## 2022-09-01 ASSESSMENT — MONTREAL COGNITIVE ASSESSMENT (MOCA)
CATEGORY CUE (IF APPLICABLE): 2
1. ALTERNATING TRAIL MAKING: 0/1
3. DRAW A CLOCK: CONTOUR, NUMBERS, HANDS: 1/3
10. [FLUENCY] NAME WORDS STARTING WITH DESIGNATED LETTER: 0/1
ORIENTATION SUBSCORE: 4/6
DELAYED RECALL SUBSCORE: 0/5
2. COPY DRAWING: 0/1
5. MEMORY TRIALS: SECOND TRIAL
9. REPEAT EACH SENTENCE: 1/2
4. NAME EACH OF THE THREE ANIMALS SHOWN: 3/3
6. READ LIST OF DIGITS [FORWARD/BACKWARD]: 2/2
WHAT IS THE TOTAL SCORE (OUT OF 30): 11
8. SERIAL SUBTRACTION OF 7S: 0/5
11. FOR EACH PAIR OF WORDS, WHAT CATEGORY DO THEY BELONG TO (OUT OF 2): 0/2
WHAT IS THE VERSION OF MOCA ADMINISTERED: 8.1
ADD 1 POINT IF LESS THAN OR EQUAL TO 12 YR EDUCATION LEVEL: 0
7. [VIGILENCE] TAP WHEN HEARING DESIGNATED LETTER: 0/1
CATEGORY CUE (IF APPLICABLE): 2

## 2022-09-02 NOTE — PROGRESS NOTES
Varghese Ortiz is a 66 y.o. female who presents with her  Tate, for follow-up, with a history of moderate Alzheimer's disease.     YARY Amaya states that she is doing fairly well.  She recognizes the difficulty she is having, but she feels she is compensating well, and requires no more assistance from Tate then usual.    According to Tate, she is having more more problems with multitasking, essentially she needs to complete a single task at a time.  They write lists as well as the date and time to do each task, and with this in hand, she actually does well.  Though they have a calendar, it is Tate who usually is using it and bring her attention to it.  She still needs the usual amount of assistance with her medications.  Amazingly she is pretty good with her own ADLs, she is certainly sleeping well, appetite and weight are stable.    Language is a little more problematic, though she seems to engage in conversation easily, she has to gather her thoughts a little bit more often.  Some word substitutions often occur, she seems to be oblivious to when this happens.    Bowel and bladder control are maintained, though there is some slight increased urinary urgency.  She is not incontinent.  Gait is normal.  Though there have been no major changes in her personal behaviors, her levels of frustration and anxiety are increasing, and she is reacting to smaller issues.  There have been no major problems with hallucinations or delirium, though this had happened last year during the winter months when the sun went down earlier.     She is on Aricept 10 mg every evening, they have been adjusting her mirabegron ER dosing, now 3 times a day, which has helped some of the urinary urgency and which she seems to be tolerating well.    Medical, surgical and family histories are reviewed, there are no new drug allergies.  Other than the Aricept and the mirabegron, she is on vitamin D.    Review of Systems  "  Unable to perform ROS: Dementia     Objective     /70 (BP Location: Right arm, Patient Position: Sitting, BP Cuff Size: Adult)   Pulse 72   Temp 37.1 °C (98.7 °F) (Temporal)   Resp 15   Ht 1.626 m (5' 4.02\")   Wt 63.5 kg (139 lb 15.9 oz)   SpO2 96%   BMI 24.02 kg/m²      Physical Exam    She appears in no acute distress.  She is clean and appropriately dressed.  She is cooperative.  Vital signs are stable.  There is no malar rash.  Her neck is supple.  Cardiac evaluation is unremarkable.     Neurological Exam    She is partially oriented, though she knows the month, she has difficulty with the actual date and year.  She knows our location. MoCA is 11/30.  From September of last year, score was 10/30.  Frontal release signs are absent, she is a bit more perseverative, she tends to look at Tate a little bit more often when questions are asked of her.  She also tends to give up when things get rough during the examination.  She does refocus easily when given some positive reinforcement.  Her mental processing speed is slowed, there is some slight difficulty with multistep commands.    PERRLA/EOMI, visual fields are full, facial movements are symmetric, sensory exam is intact to light touch.  There is no bulbar dysfunction.  There is no hypophonia or dysarthria.  Shoulder shrug and head rotation are normal.    Musculoskeletal exam reveals normal tone without tremor or drift.  Strength is grossly intact throughout.  There are no pathologic reflexes.    She stands slowly but can do so easily, stride length is maintained, gait is normal and station.  There is no appendicular dystaxia.  Fine motor control is symmetric.    Sensory exam is intact to light touch and vibration.    Assessment & Plan     1. Late onset Alzheimer's dementia without behavioral disturbance (HCC)  So far things are stable, Tate seems to be compensating well for the difficulties that his wife is having.  He was told that we are treating " her symptomatically, but for now her symptoms seem to be well controlled and there is no need to add additional medication.  He understands that the Aricept is there to slow the progression of her symptoms down, even though the disease itself is continuing to progress at its own pace.  There were both encouraged to stay physically active, she seems to be sleeping well, maintaining good sleep hygiene over the course of the night.  She was told that Tate's assistance will be required more and more over time, and that this is okay, as it allows her to do more for herself.  (Whether or not she remembers any of this is in question.)  We will follow-up in 6 months.    Time: 40 minutes in total spent on patient care including precharting, record review, discussion with healthcare staff and documentation.  This includes face-to-face time for exam, review, discussion, as well as counseling and coordinating care.

## 2022-12-15 DIAGNOSIS — R41.3 MEMORY IMPAIRMENT: ICD-10-CM

## 2022-12-15 RX ORDER — DONEPEZIL HYDROCHLORIDE 10 MG/1
TABLET, FILM COATED ORAL
Qty: 90 TABLET | Refills: 3 | Status: SHIPPED | OUTPATIENT
Start: 2022-12-15 | End: 2023-09-07 | Stop reason: SDUPTHER

## 2022-12-15 NOTE — TELEPHONE ENCOUNTER
Received request via: Pharmacy    Was the patient seen in the last year in this department? Yes  N/V is in September  Does the patient have an active prescription (recently filled or refills available) for medication(s) requested? No    Does the patient have FDC Plus and need 100 day supply (blood pressure, diabetes and cholesterol meds only)? Patient does not have SCP

## 2023-02-23 NOTE — PROCEDURE: BIOPSY BY SHAVE METHOD
Notification Instructions: Patient will be notified of biopsy results. However, patient instructed to call the office if not contacted within 2 weeks.
Anesthesia Type: 1% lidocaine with epinephrine
Dressing: bandage
Curettage Text: The wound bed was treated with curettage after the biopsy was performed.
Biopsy Method: Personna blade
Type Of Destruction Used: Curettage
Size Of Lesion In Cm: 0
Bill For Surgical Tray: no
Electrodesiccation Text: The wound bed was treated with electrodesiccation after the biopsy was performed.
Biopsy Type: H and E
Consent: Written consent was obtained and risks were reviewed including but not limited to scarring, infection, bleeding, scabbing, incomplete removal, nerve damage and allergy to anesthesia.
Billing Type: Third-Party Bill
Silver Nitrate Text: The wound bed was treated with silver nitrate after the biopsy was performed.
Hemostasis: Drysol
Detail Level: Detailed
Anesthesia Volume In Cc: 2
Lab: [id number]
Lab Facility: 
Wound Care: Bacitracin
Electrodesiccation And Curettage Text: The wound bed was treated with electrodesiccation and curettage after the biopsy was performed.
Cryotherapy Text: The wound bed was treated with cryotherapy after the biopsy was performed.
Was A Bandage Applied: Yes
Post-Care Instructions: I reviewed with the patient in detail post-care instructions. Patient is to keep the biopsy site dry overnight, and then apply bacitracin twice daily until healed. Patient may apply hydrogen peroxide soaks to remove any crusting.
15

## 2023-04-29 ENCOUNTER — HOSPITAL ENCOUNTER (OUTPATIENT)
Dept: RADIOLOGY | Facility: MEDICAL CENTER | Age: 68
End: 2023-04-29
Attending: PHYSICIAN ASSISTANT
Payer: MEDICARE

## 2023-04-29 ENCOUNTER — OFFICE VISIT (OUTPATIENT)
Dept: URGENT CARE | Facility: PHYSICIAN GROUP | Age: 68
End: 2023-04-29
Payer: MEDICARE

## 2023-04-29 VITALS
SYSTOLIC BLOOD PRESSURE: 110 MMHG | WEIGHT: 139 LBS | HEART RATE: 74 BPM | BODY MASS INDEX: 24.63 KG/M2 | RESPIRATION RATE: 16 BRPM | TEMPERATURE: 97.2 F | OXYGEN SATURATION: 98 % | HEIGHT: 63 IN | DIASTOLIC BLOOD PRESSURE: 60 MMHG

## 2023-04-29 DIAGNOSIS — S52.612A CLOSED DISPLACED FRACTURE OF STYLOID PROCESS OF LEFT ULNA, INITIAL ENCOUNTER: ICD-10-CM

## 2023-04-29 DIAGNOSIS — M25.532 LEFT WRIST PAIN: ICD-10-CM

## 2023-04-29 DIAGNOSIS — S52.592A OTHER CLOSED FRACTURE OF DISTAL END OF LEFT RADIUS, INITIAL ENCOUNTER: ICD-10-CM

## 2023-04-29 PROCEDURE — 73110 X-RAY EXAM OF WRIST: CPT | Mod: LT

## 2023-04-29 PROCEDURE — 99213 OFFICE O/P EST LOW 20 MIN: CPT | Mod: 25 | Performed by: PHYSICIAN ASSISTANT

## 2023-04-29 PROCEDURE — 29105 APPLICATION LONG ARM SPLINT: CPT | Performed by: PHYSICIAN ASSISTANT

## 2023-04-29 ASSESSMENT — ENCOUNTER SYMPTOMS
NUMBNESS: 0
DIZZINESS: 0
TINGLING: 0
LOSS OF CONSCIOUSNESS: 0
HEADACHES: 0
FALLS: 1
MUSCLE WEAKNESS: 0

## 2023-04-29 NOTE — PROGRESS NOTES
Subjective:   Monique Ortiz is a 67 y.o. female who presents today with   Chief Complaint   Patient presents with    Wrist Injury     This morning poss broken L wrist, looks swollen/ dislocated       Wrist Injury   The incident occurred 1 to 3 hours ago. The incident occurred at home. The injury mechanism was a fall. The pain is present in the left wrist. The quality of the pain is described as aching. The pain is moderate. The pain has been Constant since the incident. Pertinent negatives include no chest pain, muscle weakness, numbness or tingling. The symptoms are aggravated by movement. She has tried rest for the symptoms. The treatment provided mild relief.     Patient had left wrist injury after a fall accidentally down 2 or 3 stairs this morning.  She states she is not having any other pain or complaints at this time.  She did not hit her head or have any loss of consciousness.  No back or neck pain.  The only thing that is bothering her is her left wrist.  She has noted a deformity to the area as well.    PMH:  has no past medical history of Breast cancer (HCC).  MEDS:   Current Outpatient Medications:     donepezil (ARICEPT) 10 MG tablet, TAKE 1 TABLET BY MOUTH EVERY DAY IN THE EVENING, Disp: 90 Tablet, Rfl: 3    Mirabegron ER 50 MG TABLET SR 24 HR, Take 50 mg by mouth every day., Disp: 100 Tablet, Rfl: 3    vitamin D3 (CHOLECALCIFEROL) 1000 Unit (25 mcg) Tab, Take 1,000 Units by mouth every day., Disp: , Rfl:   ALLERGIES:   Allergies   Allergen Reactions    Seasonal     Aspirin      Tight chest     SURGHX: History reviewed. No pertinent surgical history.  SOCHX:  reports that she has never smoked. She has never used smokeless tobacco. She reports current alcohol use of about 0.6 oz per week. She reports that she does not use drugs.  FH: Reviewed with patient, not pertinent to this visit.       Review of Systems   Cardiovascular:  Negative for chest pain.   Musculoskeletal:  Positive for falls.         "Left wrist pain   Neurological:  Negative for dizziness, tingling, loss of consciousness, numbness and headaches.      Objective:   /60   Pulse 74   Temp 36.2 °C (97.2 °F) (Temporal)   Resp 16   Ht 1.6 m (5' 3\")   Wt 63 kg (139 lb)   SpO2 98%   BMI 24.62 kg/m²   Physical Exam  Vitals and nursing note reviewed.   Constitutional:       General: She is not in acute distress.     Appearance: Normal appearance. She is well-developed. She is not ill-appearing or toxic-appearing.   HENT:      Head: Normocephalic and atraumatic.      Right Ear: Hearing normal.      Left Ear: Hearing normal.   Cardiovascular:      Rate and Rhythm: Normal rate.   Pulmonary:      Effort: Pulmonary effort is normal.   Musculoskeletal:        Arms:       Comments: Patient has tenderness to palpation to the distal radius and ulna with noted swelling/deformity to the volar aspect.  Neurovascular intact distally.  Less than 2 capillary refill.  Patient has limited range of motion of the left hand and wrist secondary to pain in the wrist.  No tenderness to palpation to the forearm or elbow.   Skin:     General: Skin is warm and dry.   Neurological:      Mental Status: She is alert.      Coordination: Coordination normal.   Psychiatric:         Mood and Affect: Mood normal.       DX WRIST  FINDINGS:  There is a dorsally impacted/dorsally displaced distal radius fracture. There is apex volar angulation. There is questionable intra-articular extension.     There is an ulnar styloid fracture.     The carpal bones are intact.     IMPRESSION:     Distal radius fracture.     Ulnar styloid fracture.    Assessment/Plan:   Assessment    1. Other closed fracture of distal end of left radius, initial encounter  - Referral to Orthopedics    2. Closed displaced fracture of styloid process of left ulna, initial encounter  - Referral to Orthopedics    3. Left wrist pain  - DX-WRIST-COMPLETE 3+ LEFT; Future  Patient placed in [facility] glass by " myself with the assistance of MA.  Following placement, patient is comfortable and still NVI.   Recommend follow-up with orthopedic specialty early next week.  Referral placed today.  Patient and her significant other will call them to make an appointment for next week as soon as they can. Sling also provided for patient to use as needed for comfort.    Differential diagnosis, natural history, supportive care, and indications for immediate follow-up discussed.   Patient given instructions and understanding of medications and treatment.    If not improving in 3-5 days, F/U with PCP or return to  if symptoms worsen.    Patient agreeable to plan.      Please note that this dictation was created using voice recognition software. I have made every reasonable attempt to correct obvious errors, but I expect that there are errors of grammar and possibly content that I did not discover before finalizing the note.    Hema Vargas PA-C

## 2023-06-28 ENCOUNTER — DOCUMENTATION (OUTPATIENT)
Dept: HEALTH INFORMATION MANAGEMENT | Facility: OTHER | Age: 68
End: 2023-06-28
Payer: MEDICARE

## 2023-08-31 ENCOUNTER — TELEPHONE (OUTPATIENT)
Dept: HEALTH INFORMATION MANAGEMENT | Facility: OTHER | Age: 68
End: 2023-08-31

## 2023-09-05 ENCOUNTER — TELEPHONE (OUTPATIENT)
Dept: HEALTH INFORMATION MANAGEMENT | Facility: OTHER | Age: 68
End: 2023-09-05
Payer: MEDICARE

## 2023-09-07 ENCOUNTER — OFFICE VISIT (OUTPATIENT)
Dept: NEUROLOGY | Facility: MEDICAL CENTER | Age: 68
End: 2023-09-07
Attending: PSYCHIATRY & NEUROLOGY
Payer: MEDICARE

## 2023-09-07 VITALS
BODY MASS INDEX: 26.84 KG/M2 | HEART RATE: 70 BPM | SYSTOLIC BLOOD PRESSURE: 100 MMHG | OXYGEN SATURATION: 97 % | HEIGHT: 63 IN | DIASTOLIC BLOOD PRESSURE: 64 MMHG | WEIGHT: 151.46 LBS | TEMPERATURE: 98.7 F

## 2023-09-07 DIAGNOSIS — N32.81 OVERACTIVE BLADDER: ICD-10-CM

## 2023-09-07 DIAGNOSIS — G30.1 LATE ONSET ALZHEIMER'S DEMENTIA WITHOUT BEHAVIORAL DISTURBANCE (HCC): ICD-10-CM

## 2023-09-07 DIAGNOSIS — F02.80 LATE ONSET ALZHEIMER'S DEMENTIA WITHOUT BEHAVIORAL DISTURBANCE (HCC): ICD-10-CM

## 2023-09-07 PROBLEM — R41.3 MEMORY IMPAIRMENT: Status: ACTIVE | Noted: 2023-09-07

## 2023-09-07 PROCEDURE — 3074F SYST BP LT 130 MM HG: CPT | Performed by: PSYCHIATRY & NEUROLOGY

## 2023-09-07 PROCEDURE — 99211 OFF/OP EST MAY X REQ PHY/QHP: CPT | Performed by: PSYCHIATRY & NEUROLOGY

## 2023-09-07 PROCEDURE — 3078F DIAST BP <80 MM HG: CPT | Performed by: PSYCHIATRY & NEUROLOGY

## 2023-09-07 PROCEDURE — 99215 OFFICE O/P EST HI 40 MIN: CPT | Performed by: PSYCHIATRY & NEUROLOGY

## 2023-09-07 RX ORDER — DONEPEZIL HYDROCHLORIDE 10 MG/1
10 TABLET, FILM COATED ORAL EVERY EVENING
Qty: 90 TABLET | Refills: 3 | Status: SHIPPED | OUTPATIENT
Start: 2023-09-07 | End: 2023-09-07 | Stop reason: SDUPTHER

## 2023-09-07 RX ORDER — DONEPEZIL HYDROCHLORIDE 10 MG/1
10 TABLET, FILM COATED ORAL EVERY EVENING
Qty: 90 TABLET | Refills: 3 | Status: SHIPPED | OUTPATIENT
Start: 2023-09-07

## 2023-09-07 ASSESSMENT — MONTREAL COGNITIVE ASSESSMENT (MOCA)
4. NAME EACH OF THE THREE ANIMALS SHOWN: 3/3
ORIENTATION SUBSCORE: 2/6
CATEGORY CUE (IF APPLICABLE): 3
DELAYED RECALL SUBSCORE: 0/5
CATEGORY CUE (IF APPLICABLE): 1
WHAT IS THE VERSION OF MOCA ADMINISTERED: 8.1
11. FOR EACH PAIR OF WORDS, WHAT CATEGORY DO THEY BELONG TO (OUT OF 2): 1/2
ADD 1 POINT IF LESS THAN OR EQUAL TO 12 YR EDUCATION LEVEL: 0
1. ALTERNATING TRAIL MAKING: 0/1
2. COPY DRAWING: 0/1
6. READ LIST OF DIGITS [FORWARD/BACKWARD]: 1/2
5. MEMORY TRIALS: SECOND TRIAL
3. DRAW A CLOCK: CONTOUR, NUMBERS, HANDS: 1/3
8. SERIAL SUBTRACTION OF 7S: 0/5
10. [FLUENCY] NAME WORDS STARTING WITH DESIGNATED LETTER: 0/1
7. [VIGILENCE] TAP WHEN HEARING DESIGNATED LETTER: 1/1
WHAT IS THE TOTAL SCORE (OUT OF 30): 9
9. REPEAT EACH SENTENCE: 0/2

## 2023-09-07 ASSESSMENT — ENCOUNTER SYMPTOMS
WEIGHT LOSS: 0
DEPRESSION: 0
FALLS: 1
HALLUCINATIONS: 0
MEMORY LOSS: 1
LOSS OF CONSCIOUSNESS: 0
INSOMNIA: 0

## 2023-09-07 ASSESSMENT — PATIENT HEALTH QUESTIONNAIRE - PHQ9: CLINICAL INTERPRETATION OF PHQ2 SCORE: 0

## 2023-09-07 NOTE — PROGRESS NOTES
Subjective     Monique Ortiz is a 67 y.o. female who presents with her  Tate, who provides most of the history, for follow-up, with a history of moderate Alzheimer's disease.     HPI    To direct questioning, Monique states that she is doing very well.  She recognizes that she does have some memory difficulties, but with specific questions, she denies major problems with her medicines, speaking with others, remembering from moment to moment, completing tasks at home, balance, bowel and bladder control, daily routines, etc.  She enjoys herself with others, gets out with Tate, they are physically active.    Frequent reminders are still required.  She does repeat herself.  She still has some difficulty keeping pace in conversation, there is more more hesitancy and word finding issues.  She does require a little more assistance with almost all of this from Tate.  She does have a good appetite, eats consistently, can use utensils appropriately.  She is still independent with her ADLs.  She does not require cueing to remember to do things like showering, etc.  Bowel and bladder control are maintained, she uses Myrbetriq daily to help with urinary urgency.    She did suffer from a fall this last April that was walking downstairs and catching her foot on a place rug.  She has not fallen on any other circumstance, does not look more unsteady under routine conditions.  She sleeps well.  There have been no problems with hallucinations, delirium or insomnia and pacing.    She is on Aricept 10 mg every evening.    Medical, surgical and family histories are reviewed, there are no new drug allergies.  She is on donepezil 10 mg every evening and Myrbetriq SR 50 mg every morning.    Review of Systems   Constitutional:  Negative for malaise/fatigue and weight loss.   Genitourinary:  Positive for urgency.   Musculoskeletal:  Positive for falls.   Neurological:  Negative for loss of consciousness.   Psychiatric/Behavioral:   "Positive for memory loss. Negative for depression and hallucinations. The patient does not have insomnia.    All other systems reviewed and are negative.    Objective     /64 (BP Location: Right arm, Patient Position: Sitting, BP Cuff Size: Adult)   Pulse 70   Temp 37.1 °C (98.7 °F) (Temporal)   Ht 1.6 m (5' 3\")   Wt 68.7 kg (151 lb 7.3 oz)   SpO2 97%   BMI 26.83 kg/m²      Physical Exam    She appears in no acute distress.  She is clean and appropriately dressed, she is quite cooperative.  She interacts easily with the examiner, but becomes very self-conscious when asked questions as part of her evaluation.  She tends to give up easily with tasks asked of her, but can actually do fairly well if reassured and given enough time.  Her vital signs are stable.  There is no malar rash.  Her neck is supple.  Cardiac evaluation reveals a regular rhythm.  There is no lower extremity edema.     Neurological Exam    She is oriented only to place including my office and the city.  When asked about the date, she simply states that she \"cannot do this\".  MoCA is 9/30 (1 year ago it was 11/30).  There is some hand-object frontal release signs are absent.  Substitution with motor skills though there is no apraxia.  Word fluency is diminished, paraphasic errors or not used.  Her mood is euthymic, affect is slightly blunted.    PERRLA/EOMI, visual fields are full to movement section on confrontation bilaterally.  Facial movements are symmetric, there is no hypophonia or bradykinesia.  The tongue and uvula are midline, jaw movements are intact.  Shoulder shrug and head rotation are normal.  Sensory exam is intact to light touch and temperature.    Musculoskeletal exam reveals normal tone without tremor, asterixis, or drift.  Strength is intact bilaterally.  Reflexes are easily elicited throughout, there are no asymmetries from side to side.  There are no pathologic reflexes.  Toes are downgoing.    She stands easily, gait " is normal and station and stride length.  Armswing is symmetric.  There is no appendicular dystaxia.  Fine motor control is intact in all 4 extremities.    Sensory exam is intact to vibration and temperature.  Romberg is absent.    Assessment & Plan     1. Late onset Alzheimer's dementia without behavioral disturbance (HCC)  There has been a little progression only, not unexpected, certainly nothing out of proportion to what I would expect having seen her only 6 months ago.  She and Tate are doing incredibly well given the nature and severity of her disease and its symptoms.  I encouraged her to be active, to socialize and get herself out of the house as much as she can.  There have been no major issues with bowel or bladder control, behavioral abnormalities, loss of appetite and weight loss, etc.  There is no reason to rock the boat in this regard.  We will follow-up in 6 months.    - donepezil (ARICEPT) 10 MG tablet; Take 1 Tablet by mouth every evening.  Dispense: 90 Tablet; Refill: 3    3. Overactive bladder  I will renew the Myrbetriq for the next couple of months until she has a chance to follow-up with her new PCP.    - Mirabegron ER 50 MG TABLET SR 24 HR; Take 50 mg by mouth every day.  Dispense: 30 Tablet; Refill: 2    Time: 40 minutes in total spent on patient care including pre-charting, record review, discussion with healthcare staff and documentation.  This includes face-to-face time for exam, review, discussion, as well as counseling and coordinating care.

## 2023-10-09 PROBLEM — I73.9 PERIPHERAL VASCULAR DISEASE, UNSPECIFIED (HCC): Status: ACTIVE | Noted: 2023-10-09

## 2023-10-26 ENCOUNTER — OFFICE VISIT (OUTPATIENT)
Dept: MEDICAL GROUP | Facility: PHYSICIAN GROUP | Age: 68
End: 2023-10-26
Payer: MEDICARE

## 2023-10-26 VITALS
OXYGEN SATURATION: 97 % | RESPIRATION RATE: 16 BRPM | WEIGHT: 156.56 LBS | HEIGHT: 63 IN | SYSTOLIC BLOOD PRESSURE: 106 MMHG | BODY MASS INDEX: 27.74 KG/M2 | HEART RATE: 79 BPM | TEMPERATURE: 97.8 F | DIASTOLIC BLOOD PRESSURE: 66 MMHG

## 2023-10-26 DIAGNOSIS — Z11.59 NEED FOR HEPATITIS C SCREENING TEST: ICD-10-CM

## 2023-10-26 DIAGNOSIS — N32.81 OVERACTIVE BLADDER: ICD-10-CM

## 2023-10-26 DIAGNOSIS — G31.9 CEREBRAL ATROPHY (HCC): ICD-10-CM

## 2023-10-26 DIAGNOSIS — E55.9 VITAMIN D DEFICIENCY: ICD-10-CM

## 2023-10-26 DIAGNOSIS — E78.5 DYSLIPIDEMIA: ICD-10-CM

## 2023-10-26 DIAGNOSIS — Z23 NEED FOR VACCINATION: ICD-10-CM

## 2023-10-26 DIAGNOSIS — S60.512A: ICD-10-CM

## 2023-10-26 DIAGNOSIS — R35.0 FREQUENCY OF URINATION: ICD-10-CM

## 2023-10-26 DIAGNOSIS — D69.6 THROMBOCYTOPENIA (HCC): ICD-10-CM

## 2023-10-26 DIAGNOSIS — G30.1 LATE ONSET ALZHEIMER'S DEMENTIA WITHOUT BEHAVIORAL DISTURBANCE (HCC): ICD-10-CM

## 2023-10-26 DIAGNOSIS — F02.80 LATE ONSET ALZHEIMER'S DEMENTIA WITHOUT BEHAVIORAL DISTURBANCE (HCC): ICD-10-CM

## 2023-10-26 PROBLEM — J32.9 CHRONIC SINUSITIS: Status: RESOLVED | Noted: 2020-03-18 | Resolved: 2023-10-26

## 2023-10-26 PROBLEM — R04.0 BLEEDING FROM THE NOSE: Status: RESOLVED | Noted: 2017-11-07 | Resolved: 2023-10-26

## 2023-10-26 PROBLEM — Z76.89 ENCOUNTER TO ESTABLISH CARE: Status: RESOLVED | Noted: 2021-01-14 | Resolved: 2023-10-26

## 2023-10-26 PROBLEM — H92.03 EAR PAIN, BILATERAL: Status: RESOLVED | Noted: 2022-07-21 | Resolved: 2023-10-26

## 2023-10-26 PROCEDURE — 90662 IIV NO PRSV INCREASED AG IM: CPT | Performed by: PHYSICIAN ASSISTANT

## 2023-10-26 PROCEDURE — 99214 OFFICE O/P EST MOD 30 MIN: CPT | Mod: 25 | Performed by: PHYSICIAN ASSISTANT

## 2023-10-26 PROCEDURE — 90472 IMMUNIZATION ADMIN EACH ADD: CPT | Performed by: PHYSICIAN ASSISTANT

## 2023-10-26 PROCEDURE — 90677 PCV20 VACCINE IM: CPT | Performed by: PHYSICIAN ASSISTANT

## 2023-10-26 PROCEDURE — G0008 ADMIN INFLUENZA VIRUS VAC: HCPCS | Performed by: PHYSICIAN ASSISTANT

## 2023-10-26 PROCEDURE — G0009 ADMIN PNEUMOCOCCAL VACCINE: HCPCS | Performed by: PHYSICIAN ASSISTANT

## 2023-10-26 PROCEDURE — 3074F SYST BP LT 130 MM HG: CPT | Performed by: PHYSICIAN ASSISTANT

## 2023-10-26 PROCEDURE — 90715 TDAP VACCINE 7 YRS/> IM: CPT | Performed by: PHYSICIAN ASSISTANT

## 2023-10-26 PROCEDURE — 3078F DIAST BP <80 MM HG: CPT | Performed by: PHYSICIAN ASSISTANT

## 2023-10-26 ASSESSMENT — ENCOUNTER SYMPTOMS
SHORTNESS OF BREATH: 0
CHILLS: 0
FEVER: 0

## 2023-10-26 NOTE — PROGRESS NOTES
Subjective:     CC: establish  care; prior GEENA Peres    HPI:   Monique presents today with her  for the following:    Problem   Peripheral Vascular Disease, Unspecified (Hcc)    Strong pedal pulses bilaterally.   Denies pain, numbness, tingling, claudication.   Discussed RAHAT testing but do not feel it is necessary at this time.     Overactive Bladder    Chronic, controlled.  Tolerating mirabegron ER 50mg --> seems to help.     Thrombocytopenia (Hcc)    Chronic, uncontrolled.  Component      Latest Ref Rng 8/17/2020   Platelet Count      164 - 446 K/uL 142 (L)       Due for labs.     Cerebral Atrophy (Hcc)    MRI brain 2021    Patient lives with  and no longer drives  Followed neurology       Vitamin D Deficiency    Chronic, control unknown.  OTC 2000 IU daily.  Due for labs.     Dyslipidemia    Chronic, uncontrolled.     Latest Labs:   Lab Results   Component Value Date/Time    CHOLSTRLTOT 221 (H) 08/17/2020 08:20 AM     (H) 08/17/2020 08:20 AM    HDL 95 08/17/2020 08:20 AM    TRIGLYCERIDE 103 08/17/2020 08:20 AM      Medications: none    Risk calculator: The ASCVD Risk score (Michelle DK, et al., 2019) failed to calculate.        Late Onset Alzheimer's Dementia Without Behavioral Disturbance (Hcc)    Chronic, uncontrolled.    Followed by neurology, Dr. Carty, every 6 months.  On Aricept 10mg every evening.  Able to recall 0/3 words and name 6 animals in one minute  Patient was not able to draw the clock  Patient lives with  and no longer drives  Cont to f/u with neurology as directed      Bmi 27.0-27.9,Adult (Resolved)   Ear Pain, Bilateral (Resolved)   Frequency of Urination (Resolved)    Chronic, controlled.  Tolerating mirabegron 50mg daily.     Encounter to Establish Care (Resolved)   Chronic Sinusitis (Resolved)   Bleeding From The Nose (Resolved)    IMO load March 2020           ROS:  Review of Systems   Constitutional:  Negative for chills and fever.   Respiratory:   "Negative for shortness of breath.    Cardiovascular:  Negative for chest pain.       Objective:     Exam:  /66 (BP Location: Left arm, Patient Position: Sitting, BP Cuff Size: Adult)   Pulse 79   Temp 36.6 °C (97.8 °F) (Temporal)   Resp 16   Ht 1.6 m (5' 3\")   Wt 71 kg (156 lb 9 oz)   SpO2 97%   Breastfeeding No   BMI 27.73 kg/m²  Body mass index is 27.73 kg/m².    Physical Exam  Constitutional:       Appearance: Normal appearance.   HENT:      Head: Normocephalic and atraumatic.   Eyes:      Extraocular Movements: Extraocular movements intact.      Conjunctiva/sclera: Conjunctivae normal.      Pupils: Pupils are equal, round, and reactive to light.   Cardiovascular:      Rate and Rhythm: Normal rate and regular rhythm.      Heart sounds: Normal heart sounds.   Pulmonary:      Effort: Pulmonary effort is normal.      Breath sounds: Normal breath sounds.   Abdominal:      General: Abdomen is flat. Bowel sounds are normal. There is no distension.      Palpations: Abdomen is soft. There is no mass.      Tenderness: There is no abdominal tenderness. There is no guarding.   Musculoskeletal:      Cervical back: Normal range of motion and neck supple.   Skin:     General: Skin is warm and dry.      Comments: Small healing scratch on the left hand.  No signs of infection noted.   Neurological:      General: No focal deficit present.      Mental Status: She is alert and oriented to person, place, and time.   Psychiatric:         Mood and Affect: Mood normal.                 Assessment & Plan:     67 y.o. female with the following -     1. Dyslipidemia  Chronic, uncontrolled.  Mildly elevated LDL.  Due to repeat labs.    - Lipid Profile; Future    2. Vitamin D deficiency  Chronic, control unknown.  Currently taking 2000 IU daily, over-the-counter.  Continue current dosing.    - VITAMIN D,25 HYDROXY (DEFICIENCY); Future    3. Thrombocytopenia (HCC)  Chronic, uncontrolled.  Due to repeat labs.    - CBC WITH " DIFFERENTIAL; Future    4. Late onset Alzheimer's dementia without behavioral disturbance (HCC)  Chronic, uncontrolled but stable.  Managed by neurology.    - VITAMIN B12; Future  - FOLATE; Future  - VITAMIN B1; Future    5. Cerebral atrophy (HCC)  See #4    6. Overactive bladder  Chronic, stable.  Continue mirabegron ER 50 mg daily.    7. Frequency of urination  See #6.    - Comp Metabolic Panel; Future    8. Scratch of hand, left, initial encounter    - Tdap Vaccine =>8YO IM    9. Need for vaccination    - Influenza Vaccine, High Dose (65+ Only)  - Pneumococcal Conjugate Vaccine 20-Valent (6 wks+)  - Tdap Vaccine =>8YO IM    10. Need for hepatitis C screening test    - HCV Scrn ( 0605-1047 1xLife); Future      Have labs drawn.    Healthcare Maintenance:          Return for lab discussion.    Please note that this dictation was created using voice recognition software. I have made every reasonable attempt to correct obvious errors, but I expect that there are errors of grammar and possibly content that I did not discover before finalizing the note.

## 2023-10-26 NOTE — PROGRESS NOTES
Annual Health Assessment Questions:    1.  Are you currently engaging in any exercise or physical activity? No    2.  How would you describe your mood or emotional well-being today? good    3.  Have you had any falls in the last year? Yes, broke a wrist    4.  Have you noticed any problems with your balance or had difficulty walking? No    5.  In the last six months have you experienced any leakage of urine? No    6. DPA/Advanced Directive: Patient has Advance Directive on file.

## 2023-11-06 DIAGNOSIS — N32.81 OVERACTIVE BLADDER: ICD-10-CM

## 2023-11-06 NOTE — TELEPHONE ENCOUNTER
Caller Name: Tate  Call Back Number: [phone] (home)       **Patient's  called and had forgotten to mention to refill this rx at most recent visit. They are currently on their last refill, will be good for 30 days, they asked if they can have an annual prescription for this medication.**

## 2023-11-14 ENCOUNTER — HOSPITAL ENCOUNTER (OUTPATIENT)
Dept: LAB | Facility: MEDICAL CENTER | Age: 68
End: 2023-11-14
Attending: PHYSICIAN ASSISTANT
Payer: MEDICARE

## 2023-11-14 DIAGNOSIS — D69.6 THROMBOCYTOPENIA (HCC): ICD-10-CM

## 2023-11-14 DIAGNOSIS — G30.1 LATE ONSET ALZHEIMER'S DEMENTIA WITHOUT BEHAVIORAL DISTURBANCE (HCC): ICD-10-CM

## 2023-11-14 DIAGNOSIS — F02.80 LATE ONSET ALZHEIMER'S DEMENTIA WITHOUT BEHAVIORAL DISTURBANCE (HCC): ICD-10-CM

## 2023-11-14 DIAGNOSIS — Z11.59 NEED FOR HEPATITIS C SCREENING TEST: ICD-10-CM

## 2023-11-14 DIAGNOSIS — E55.9 VITAMIN D DEFICIENCY: ICD-10-CM

## 2023-11-14 DIAGNOSIS — E78.5 DYSLIPIDEMIA: ICD-10-CM

## 2023-11-14 DIAGNOSIS — R35.0 FREQUENCY OF URINATION: ICD-10-CM

## 2023-11-14 LAB
ALBUMIN SERPL BCP-MCNC: 4.5 G/DL (ref 3.2–4.9)
ALBUMIN/GLOB SERPL: 1.7 G/DL
ALP SERPL-CCNC: 83 U/L (ref 30–99)
ALT SERPL-CCNC: 19 U/L (ref 2–50)
ANION GAP SERPL CALC-SCNC: 9 MMOL/L (ref 7–16)
AST SERPL-CCNC: 22 U/L (ref 12–45)
BASOPHILS # BLD AUTO: 0.2 % (ref 0–1.8)
BASOPHILS # BLD: 0.01 K/UL (ref 0–0.12)
BILIRUB SERPL-MCNC: 0.8 MG/DL (ref 0.1–1.5)
BUN SERPL-MCNC: 15 MG/DL (ref 8–22)
CALCIUM ALBUM COR SERPL-MCNC: 9.8 MG/DL (ref 8.5–10.5)
CALCIUM SERPL-MCNC: 10.2 MG/DL (ref 8.5–10.5)
CHLORIDE SERPL-SCNC: 104 MMOL/L (ref 96–112)
CHOLEST SERPL-MCNC: 268 MG/DL (ref 100–199)
CO2 SERPL-SCNC: 26 MMOL/L (ref 20–33)
CREAT SERPL-MCNC: 0.58 MG/DL (ref 0.5–1.4)
EOSINOPHIL # BLD AUTO: 0.04 K/UL (ref 0–0.51)
EOSINOPHIL NFR BLD: 0.9 % (ref 0–6.9)
ERYTHROCYTE [DISTWIDTH] IN BLOOD BY AUTOMATED COUNT: 44 FL (ref 35.9–50)
FASTING STATUS PATIENT QL REPORTED: NORMAL
GFR SERPLBLD CREATININE-BSD FMLA CKD-EPI: 98 ML/MIN/1.73 M 2
GLOBULIN SER CALC-MCNC: 2.6 G/DL (ref 1.9–3.5)
GLUCOSE SERPL-MCNC: 84 MG/DL (ref 65–99)
HCT VFR BLD AUTO: 47.4 % (ref 37–47)
HDLC SERPL-MCNC: 104 MG/DL
HGB BLD-MCNC: 14.8 G/DL (ref 12–16)
IMM GRANULOCYTES # BLD AUTO: 0.01 K/UL (ref 0–0.11)
IMM GRANULOCYTES NFR BLD AUTO: 0.2 % (ref 0–0.9)
LDLC SERPL CALC-MCNC: 150 MG/DL
LYMPHOCYTES # BLD AUTO: 1.27 K/UL (ref 1–4.8)
LYMPHOCYTES NFR BLD: 29.1 % (ref 22–41)
MCH RBC QN AUTO: 28.5 PG (ref 27–33)
MCHC RBC AUTO-ENTMCNC: 31.2 G/DL (ref 32.2–35.5)
MCV RBC AUTO: 91.2 FL (ref 81.4–97.8)
MONOCYTES # BLD AUTO: 0.35 K/UL (ref 0–0.85)
MONOCYTES NFR BLD AUTO: 8 % (ref 0–13.4)
NEUTROPHILS # BLD AUTO: 2.68 K/UL (ref 1.82–7.42)
NEUTROPHILS NFR BLD: 61.6 % (ref 44–72)
NRBC # BLD AUTO: 0 K/UL
NRBC BLD-RTO: 0 /100 WBC (ref 0–0.2)
PLATELET # BLD AUTO: 188 K/UL (ref 164–446)
PMV BLD AUTO: 9.4 FL (ref 9–12.9)
POTASSIUM SERPL-SCNC: 5 MMOL/L (ref 3.6–5.5)
PROT SERPL-MCNC: 7.1 G/DL (ref 6–8.2)
RBC # BLD AUTO: 5.2 M/UL (ref 4.2–5.4)
SODIUM SERPL-SCNC: 139 MMOL/L (ref 135–145)
TRIGL SERPL-MCNC: 68 MG/DL (ref 0–149)
WBC # BLD AUTO: 4.4 K/UL (ref 4.8–10.8)

## 2023-11-14 PROCEDURE — 84425 ASSAY OF VITAMIN B-1: CPT

## 2023-11-14 PROCEDURE — 82607 VITAMIN B-12: CPT

## 2023-11-14 PROCEDURE — 36415 COLL VENOUS BLD VENIPUNCTURE: CPT

## 2023-11-14 PROCEDURE — 82306 VITAMIN D 25 HYDROXY: CPT

## 2023-11-14 PROCEDURE — 85025 COMPLETE CBC W/AUTO DIFF WBC: CPT

## 2023-11-14 PROCEDURE — 80061 LIPID PANEL: CPT

## 2023-11-14 PROCEDURE — 82746 ASSAY OF FOLIC ACID SERUM: CPT

## 2023-11-14 PROCEDURE — 80053 COMPREHEN METABOLIC PANEL: CPT

## 2023-11-14 PROCEDURE — G0472 HEP C SCREEN HIGH RISK/OTHER: HCPCS

## 2023-11-15 LAB
25(OH)D3 SERPL-MCNC: 45 NG/ML (ref 30–100)
FOLATE SERPL-MCNC: 19.9 NG/ML
HCV AB SER QL: NORMAL
VIT B12 SERPL-MCNC: 572 PG/ML (ref 211–911)

## 2023-11-19 LAB — VIT B1 BLD-MCNC: 124 NMOL/L (ref 70–180)

## 2023-11-20 DIAGNOSIS — E78.5 DYSLIPIDEMIA: ICD-10-CM

## 2023-11-20 DIAGNOSIS — D72.819 LEUKOPENIA, UNSPECIFIED TYPE: ICD-10-CM

## 2024-03-01 ENCOUNTER — TELEPHONE (OUTPATIENT)
Dept: NEUROLOGY | Facility: MEDICAL CENTER | Age: 69
End: 2024-03-01
Payer: MEDICARE

## 2024-03-07 ENCOUNTER — OFFICE VISIT (OUTPATIENT)
Dept: NEUROLOGY | Facility: MEDICAL CENTER | Age: 69
End: 2024-03-07
Attending: PSYCHIATRY & NEUROLOGY
Payer: MEDICARE

## 2024-03-07 VITALS
SYSTOLIC BLOOD PRESSURE: 114 MMHG | OXYGEN SATURATION: 97 % | HEIGHT: 64 IN | HEART RATE: 75 BPM | DIASTOLIC BLOOD PRESSURE: 72 MMHG | TEMPERATURE: 98.9 F | WEIGHT: 161.16 LBS | BODY MASS INDEX: 27.51 KG/M2

## 2024-03-07 DIAGNOSIS — F02.80 LATE ONSET ALZHEIMER'S DEMENTIA WITHOUT BEHAVIORAL DISTURBANCE (HCC): ICD-10-CM

## 2024-03-07 DIAGNOSIS — G30.1 LATE ONSET ALZHEIMER'S DEMENTIA WITHOUT BEHAVIORAL DISTURBANCE (HCC): ICD-10-CM

## 2024-03-07 PROCEDURE — 3074F SYST BP LT 130 MM HG: CPT | Performed by: PSYCHIATRY & NEUROLOGY

## 2024-03-07 PROCEDURE — 99214 OFFICE O/P EST MOD 30 MIN: CPT | Performed by: PSYCHIATRY & NEUROLOGY

## 2024-03-07 PROCEDURE — 99211 OFF/OP EST MAY X REQ PHY/QHP: CPT | Performed by: PSYCHIATRY & NEUROLOGY

## 2024-03-07 PROCEDURE — 3078F DIAST BP <80 MM HG: CPT | Performed by: PSYCHIATRY & NEUROLOGY

## 2024-03-07 RX ORDER — CITALOPRAM HYDROBROMIDE 10 MG/1
10 TABLET ORAL DAILY
Qty: 30 TABLET | Refills: 3 | Status: SHIPPED | OUTPATIENT
Start: 2024-03-07

## 2024-03-07 ASSESSMENT — FIBROSIS 4 INDEX: FIB4 SCORE: 1.85

## 2024-03-07 NOTE — PROGRESS NOTES
"Subjective     Monique Ortiz is a 68 y.o. female who presents with her  Tate, for 6-month follow-up, with a history of moderate Alzheimer's disease, who evidently has been having more more issues with anxiety and evening confusion and agitation.  History is gotten mostly from her .     HPI    Angelita states she feels well.  She continues to believe she has no major problems with memory or mental functioning.  She states she is careful with her walking, she has not been falling, she denies sleeping difficulties, changes in appetite with weight loss, loss of bowel or bladder control, etc.  She acknowledges that she needs help with her medications, but she has no idea as to their name or dosing.    According to her , there has been a notable increase in his observations of his wife's anxiety.  He quite often has to talk her down when this occurs.  They spend most of their time together, and when small crises develop, this more easily sends her into a tailspin.  Even when there are multiple issues to resolve simultaneously, all of this become so overwhelming to her.  She does not hallucinate.  She still identifies Tate easily.  She is not wandering.  She has had no clear feelings of paranoid or suspicious delusions, she is not hallucinating.    She is on Aricept 10 mg every evening along with vitamin D.    Medical, surgical and family histories are reviewed, there are no new drug allergies.  She is on no medications other than the Aricept and her vitamin D.    Review of Systems   Unable to perform ROS: Dementia     Objective     /72 (BP Location: Right arm, Patient Position: Sitting, BP Cuff Size: Adult)   Pulse 75   Temp 37.2 °C (98.9 °F) (Temporal)   Ht 1.626 m (5' 4\")   Wt 73.1 kg (161 lb 2.5 oz)   SpO2 97%   BMI 27.66 kg/m²      Physical Exam    She appears in no acute distress.  Her vital signs are stable.  There is no malar rash or jaw claudication.  Her neck is supple, range " of motion is full.  Cardiac evaluation is unremarkable.     Neurological Exam    She seems to recognize that I am somebody who she has seen before, but has no concept of why and what my name is.  She identifies her  by name easily.  She knows her date of birth but not her age.  She has no concept of the date or our location.  She has difficulty with her address.  She remembers the first names of both her parents.  Though there is no apraxia, there is hand-object substitution with motor skills assessment.  She names and repeats easily.  She follows commands though multistep commands are little more problematic.  She consistently looks at her  when she is directly questioned, as if searching for an answer.    PERRLA/EOMI, visual fields are full, facial movements are symmetric, sensory exam is intact to light touch.  There is no bulbar dysfunction.    Musculoskeletal exam reveals normal tone without tremor or drift.  Strength is intact.  Reflex exam was deferred.    She stands easily, gait is normal and station and stride length.  Armswing is symmetric.  There is no appendicular dystaxia.  Fine motor control is intact in all 4 extremities.      Sensory exam is intact to temperature.    Assessment & Plan     1. Late onset Alzheimer's dementia without behavioral disturbance (HCC)  Because of the emotional lability and increased susceptibility to irritation and agitation, I will add Celexa 10 mg daily.  Tate was told that this is not uncommon as this illness progresses.  In 3 weeks we will increase the dose depending on need and tolerability.  I reviewed side effects.  Aricept will be continued unchanged.  Tate and I will communicate via phone and phone message.  I will see her back in the office in 6 months.    - Patient identified as fall risk.  Appropriate orders and counseling given.  - citalopram (CELEXA) 10 MG tablet; Take 1 Tablet by mouth every day.  Dispense: 30 Tablet; Refill: 3    Time: 30 minutes  in total spent on patient care including pre-charting, record review, discussion with healthcare staff and documentation.  This includes face-to-face time for exam, review, discussion, as well as counseling and coordinating care.    HCC Gap Form    Diagnosis: G30.1, F02.80 - Late onset Alzheimer's dementia without behavioral disturbance (HCC)  Assessment and plan: Chronic, exacerbated. Treatment and follow up: 6 month follow-up  Last edited 03/13/24 12:57 PDT by Rodger Mccall M.D.

## 2024-03-22 ENCOUNTER — TELEPHONE (OUTPATIENT)
Dept: NEUROLOGY | Facility: MEDICAL CENTER | Age: 69
End: 2024-03-22
Payer: MEDICARE

## 2024-03-22 NOTE — TELEPHONE ENCOUNTER
Pts  called and was requesting a letter in regards to his wife; Pt can't maintain her medical or financial decisions, [facility] DPA; Pt has her own trust outside of the one that her and her  have. In order to be granted access to the trust her  needs a letter stating that he is in charge of all the financials and that she is no longer capable of maintaining her medical or financial decisions. The pt will need 4 copies of this letter so that it can be sent to all the proper parties! Thank you in advance! Manasa Camarillo, Med Ass't

## 2024-03-30 DIAGNOSIS — G30.1 LATE ONSET ALZHEIMER'S DEMENTIA WITHOUT BEHAVIORAL DISTURBANCE (HCC): ICD-10-CM

## 2024-03-30 DIAGNOSIS — F02.80 LATE ONSET ALZHEIMER'S DEMENTIA WITHOUT BEHAVIORAL DISTURBANCE (HCC): ICD-10-CM

## 2024-04-02 RX ORDER — CITALOPRAM HYDROBROMIDE 10 MG/1
10 TABLET ORAL DAILY
Qty: 90 TABLET | Refills: 2 | Status: SHIPPED | OUTPATIENT
Start: 2024-04-02

## 2024-04-02 NOTE — TELEPHONE ENCOUNTER
Please advise: PTS PHARMACY IS REQUESTING A 90- DAY SUPPLY AND A DX CODE THANK YOU IN ADVANCE! Viral Conley Ass't

## 2024-04-18 ENCOUNTER — TELEPHONE (OUTPATIENT)
Dept: HEALTH INFORMATION MANAGEMENT | Facility: OTHER | Age: 69
End: 2024-04-18

## 2024-09-11 ENCOUNTER — OFFICE VISIT (OUTPATIENT)
Dept: NEUROLOGY | Facility: MEDICAL CENTER | Age: 69
End: 2024-09-11
Attending: PSYCHIATRY & NEUROLOGY
Payer: MEDICARE

## 2024-09-11 VITALS
HEIGHT: 63 IN | BODY MASS INDEX: 30.16 KG/M2 | HEART RATE: 71 BPM | OXYGEN SATURATION: 95 % | DIASTOLIC BLOOD PRESSURE: 62 MMHG | RESPIRATION RATE: 16 BRPM | TEMPERATURE: 97.7 F | WEIGHT: 170.19 LBS | SYSTOLIC BLOOD PRESSURE: 114 MMHG

## 2024-09-11 DIAGNOSIS — G30.1 LATE ONSET ALZHEIMER'S DEMENTIA WITHOUT BEHAVIORAL DISTURBANCE (HCC): Primary | ICD-10-CM

## 2024-09-11 DIAGNOSIS — F02.80 LATE ONSET ALZHEIMER'S DEMENTIA WITHOUT BEHAVIORAL DISTURBANCE (HCC): Primary | ICD-10-CM

## 2024-09-11 PROCEDURE — 3074F SYST BP LT 130 MM HG: CPT | Performed by: PSYCHIATRY & NEUROLOGY

## 2024-09-11 PROCEDURE — 99212 OFFICE O/P EST SF 10 MIN: CPT | Performed by: PSYCHIATRY & NEUROLOGY

## 2024-09-11 PROCEDURE — 3078F DIAST BP <80 MM HG: CPT | Performed by: PSYCHIATRY & NEUROLOGY

## 2024-09-11 PROCEDURE — 99215 OFFICE O/P EST HI 40 MIN: CPT | Performed by: PSYCHIATRY & NEUROLOGY

## 2024-09-11 RX ORDER — DONEPEZIL HYDROCHLORIDE 10 MG/1
10 TABLET, FILM COATED ORAL EVERY EVENING
Qty: 90 TABLET | Refills: 3 | Status: SHIPPED | OUTPATIENT
Start: 2024-09-11

## 2024-09-11 ASSESSMENT — FIBROSIS 4 INDEX: FIB4 SCORE: 1.85

## 2024-09-11 NOTE — PROGRESS NOTES
"Subjective     Monique Ortiz is a 68 y.o. female who presents with her  Tate, as always, for follow-up, with a history of moderate Alzheimer's disease.  History is gotten from review of records as well as discussions with Tate, the patient himself incapable of giving anything cogent.     YARY Oliver states that she is doing well.  She recognizes me as some when she is seen before, but has no clue why.  When asked about memory she does acknowledge some memory \"difficulties\", but nothing that is too severe.  She does not recall the name of the illness that she does have normal how long she has been seen by me.    Tate states that things are about the same in the sense that they have continue to progress slowly and steadily.  She is now requiring assistance with most of her daily routines, she does take medicines been given to her.  She eats consistently when food is offered.  She has had no problems with severe agitation or delirium, she is not hallucinating.  She does not wander.    Tate states that t they are pretty much flying alone through all of this.  Though she has a daughter and he 2 children, all of whom live locally, they have not taken an active role in their mother/stepmother's condition.  He is doing everything on his own.  They essentially do everything together.    Vocabulary is becoming more constrained, she is much more perseverative, she has greater difficulty following conversation, Tate finds himself having to repeat himself over and over again.  Even then retention is limited.    She is a little unsteady, she walks more cautiously than usual, but they are both comfortable with this.  She has not been falling with any regularity.  Bowel and bladder control are becoming a bit compromised, there is now occasional incontinence.  She cannot operate appliances.    She is on donepezil 10 mg every evening, and Celexa 10 mg every morning.    Medical, surgical and family histories are " "reviewed, there are no new drug allergies.  She is on Celexa, Aricept and vitamin D.    Review of Systems   Unable to perform ROS: Dementia     Objective     /62 (BP Location: Right arm, Patient Position: Sitting, BP Cuff Size: Adult)   Pulse 71   Temp 36.5 °C (97.7 °F) (Temporal)   Resp 16   Ht 1.6 m (5' 3\")   Wt 77.2 kg (170 lb 3.1 oz)   SpO2 95%   BMI 30.15 kg/m²      Physical Exam    She appears in no acute distress.  She is cooperative.  She is clean and appropriately dressed.  She is field dependent.  She always looks at her  initially when she is asked a question given the command.  There is no jaw claudication.  Her neck is supple.  Cardiac evaluation is unremarkable.     Neurological Exam    She is oriented to the fact that she is in a doctor's office which is in [place], [place].  Even then she needs some cueing.  She has no clue as to the date including day of the week.  When told of the information she has forgotten within less than 2 minutes, consistently.  She does name and repeat but again there is a reduction in fluency and is clearly having difficulty with word finding.  Responses to questions are simplified.  She follows commands.  There is no agnosia or apraxia, there is hand-object substitution.    PERRLA/EOMI, visual fields are full, facial movements are symmetric.  Sensory exam is intact to temperature.  The tongue and uvula are midline, there is no bulbar dysfunction.  Shoulder shrug and head rotation are symmetric.    Musculoskeletal exam reveals normal tone, there is no tremor or drift throughout.  Strength is 5/5.  Reflexes are quite brisk and present at all points without asymmetries, none are dropped.  Both toes are downgoing.    She stands easily, gait is normal and station gait is normal and station and stride length, armswing is symmetric.  There is no appendicular dystaxia.  Repetitive movements and fine motor control are normal in all 4 extremities, amplitude and " frequency symmetric.    Sensory exam is intact to temperature and vibration.  Romberg is absent.    Assessment & Plan     Assessment & Plan  Late onset Alzheimer's dementia without behavioral disturbance (HCC)  With details help and assistance, she is doing quite well.  She is certainly safe to remain at home with him, he understands that she will need to have him around, or anyone else for that matter as a caretaker, 24/7.  I am very afraid about his own health and the real potential problem for burnout.  We will continue the donepezil and Celexa for now.  They will seems to clearly have an idea of what will be happening as things progress.  He is adapting well, redirects when appropriate, allowing her to sleep, eat, even doing some physical activities.  We will follow-up in 6 months.    Orders:    donepezil (ARICEPT) 10 MG tablet; Take 1 Tablet by mouth every evening.    Time: 40 minutes in total spent on patient care including pre-charting, record review, discussion with healthcare staff and documentation.  This includes face-to-face time for exam, review, discussion, as well as counseling and coordinating care.

## 2024-09-11 NOTE — ASSESSMENT & PLAN NOTE
With details help and assistance, she is doing quite well.  She is certainly safe to remain at home with him, he understands that she will need to have him around, or anyone else for that matter as a caretaker, 24/7.  I am very afraid about his own health and the real potential problem for burnout.  We will continue the donepezil and Celexa for now.  They will seems to clearly have an idea of what will be happening as things progress.  He is adapting well, redirects when appropriate, allowing her to sleep, eat, even doing some physical activities.  We will follow-up in 6 months.    Orders:    donepezil (ARICEPT) 10 MG tablet; Take 1 Tablet by mouth every evening.    Time: 40 minutes in total spent on patient care including pre-charting, record review, discussion with healthcare staff and documentation.  This includes face-to-face time for exam, review, discussion, as well as counseling and coordinating care.

## 2024-10-03 ENCOUNTER — PATIENT MESSAGE (OUTPATIENT)
Dept: HEALTH INFORMATION MANAGEMENT | Facility: OTHER | Age: 69
End: 2024-10-03

## 2024-10-10 ENCOUNTER — PATIENT MESSAGE (OUTPATIENT)
Dept: HEALTH INFORMATION MANAGEMENT | Facility: OTHER | Age: 69
End: 2024-10-10

## 2024-12-26 DIAGNOSIS — F02.80 LATE ONSET ALZHEIMER'S DEMENTIA WITHOUT BEHAVIORAL DISTURBANCE (HCC): ICD-10-CM

## 2024-12-26 DIAGNOSIS — G30.1 LATE ONSET ALZHEIMER'S DEMENTIA WITHOUT BEHAVIORAL DISTURBANCE (HCC): ICD-10-CM

## 2024-12-26 RX ORDER — CITALOPRAM HYDROBROMIDE 10 MG/1
10 TABLET ORAL DAILY
Qty: 90 TABLET | Refills: 2 | Status: SHIPPED | OUTPATIENT
Start: 2024-12-26

## 2024-12-27 NOTE — TELEPHONE ENCOUNTER
Received request via: Pharmacy    Medication Name/Dosage Celexa     When was medication last prescribed 4/2/24    How many refills were previously provided 2    How many Refills does he patient have left from last prescription 0    Was the patient seen in the last year in this department? Yes   Date of last office visit 9/11/24     Per last Neurology Office Visit, when was the date of next follow up visit set for?           6 mths                  Date of office visit follow up request 3/11/25     Does the patient have an upcoming appointment? Yes   If yes, when 3/12/25             If no, schedule appointment     Does the patient have [facility] and need 100 day supply (blood pressure, diabetes and cholesterol meds only)? Medication is not for blood pressure, diabetes, or cholesterol

## 2025-01-16 ENCOUNTER — OFFICE VISIT (OUTPATIENT)
Dept: MEDICAL GROUP | Facility: PHYSICIAN GROUP | Age: 70
End: 2025-01-16
Payer: MEDICARE

## 2025-01-16 VITALS
HEIGHT: 63 IN | OXYGEN SATURATION: 97 % | HEART RATE: 64 BPM | WEIGHT: 166.8 LBS | TEMPERATURE: 98.6 F | RESPIRATION RATE: 16 BRPM | DIASTOLIC BLOOD PRESSURE: 64 MMHG | BODY MASS INDEX: 29.55 KG/M2 | SYSTOLIC BLOOD PRESSURE: 116 MMHG

## 2025-01-16 DIAGNOSIS — R53.83 FATIGUE, UNSPECIFIED TYPE: ICD-10-CM

## 2025-01-16 DIAGNOSIS — E78.5 DYSLIPIDEMIA: ICD-10-CM

## 2025-01-16 DIAGNOSIS — Z12.11 COLON CANCER SCREENING: ICD-10-CM

## 2025-01-16 DIAGNOSIS — Z23 NEED FOR VACCINATION: ICD-10-CM

## 2025-01-16 DIAGNOSIS — G30.1 LATE ONSET ALZHEIMER'S DEMENTIA WITHOUT BEHAVIORAL DISTURBANCE (HCC): ICD-10-CM

## 2025-01-16 DIAGNOSIS — E55.9 VITAMIN D DEFICIENCY: ICD-10-CM

## 2025-01-16 DIAGNOSIS — F02.80 LATE ONSET ALZHEIMER'S DEMENTIA WITHOUT BEHAVIORAL DISTURBANCE (HCC): ICD-10-CM

## 2025-01-16 DIAGNOSIS — Z12.31 ENCOUNTER FOR SCREENING MAMMOGRAM FOR MALIGNANT NEOPLASM OF BREAST: ICD-10-CM

## 2025-01-16 PROBLEM — D69.6 THROMBOCYTOPENIA (HCC): Status: RESOLVED | Noted: 2022-06-09 | Resolved: 2025-01-16

## 2025-01-16 PROCEDURE — 90662 IIV NO PRSV INCREASED AG IM: CPT

## 2025-01-16 PROCEDURE — G0008 ADMIN INFLUENZA VIRUS VAC: HCPCS

## 2025-01-16 PROCEDURE — 3074F SYST BP LT 130 MM HG: CPT | Performed by: PHYSICIAN ASSISTANT

## 2025-01-16 PROCEDURE — 3078F DIAST BP <80 MM HG: CPT | Performed by: PHYSICIAN ASSISTANT

## 2025-01-16 PROCEDURE — 99214 OFFICE O/P EST MOD 30 MIN: CPT | Mod: 25 | Performed by: PHYSICIAN ASSISTANT

## 2025-01-16 ASSESSMENT — ENCOUNTER SYMPTOMS
FEVER: 0
SHORTNESS OF BREATH: 0
CHILLS: 0

## 2025-01-16 ASSESSMENT — FIBROSIS 4 INDEX: FIB4 SCORE: 1.88

## 2025-01-16 NOTE — PROGRESS NOTES
"SUBJECTIVE:     CC: Follow-up chronic issues    HPI:   Monique presents today, with her , with the following:    ASSESSMENT & PLAN by Problem:     Problem   Vitamin D Deficiency    Chronic, control unknown.  OTC 2000 IU daily.     Dyslipidemia    Chronic, uncontrolled.  Elevated LDL.  Not currently medicated.  The ASCVD Risk score (Michelle KAUR, et al., 2019) failed to calculate.     Late Onset Alzheimer's Dementia Without Behavioral Disturbance (Hcc)    Chronic, uncontrolled.  Followed by neurology, Dr. Carty, every 6 months.     Thrombocytopenia (Hcc) (Resolved)    Chronic, uncontrolled.  Component      Latest Ref Rng 8/17/2020   Platelet Count      164 - 446 K/uL 142 (L)       Due for labs.         Falls: none    Advance directives: scanned in 8/3/2017    Living situation: Patient lives with her .  They have family in town but they are busy with their families.    Drives: Patient's  drives her everywhere    A letter was sent to the patient's  with information regarding [employer]'s dementia program.      Return in about 1 year (around 1/16/2026), or if symptoms worsen or fail to improve, for lab discussion.    HCC Gap Form    Last edited 01/16/25 13:02 PST by Margie Rayo P.A.-C.            HPI:     Problem List Items Addressed This Visit       Dyslipidemia     Chronic, uncontrolled.     Latest Labs:   Lab Results   Component Value Date/Time    CHOLSTRLTOT 268 (H) 11/14/2023 11:42 AM     (H) 11/14/2023 11:42 AM     11/14/2023 11:42 AM    TRIGLYCERIDE 68 11/14/2023 11:42 AM      Medications: none    Risk calculator: The ASCVD Risk score (Michelle KAUR, et al., 2019) failed to calculate.            Relevant Orders    Lipid Profile    Late onset Alzheimer's dementia without behavioral disturbance (HCC)     Chronic, uncontrolled.  Patient is followed by neurology.  Patient's  is very overwhelmed with her care, saying \"it has always 911.\"  Discussed possible resources for " "respite care a couple hours a day or other outside help.  Patient is adamant he wants to keep her in the home and wants to care for her.  Patient is very polite and adds he will let me know if he needs any help.  She apparently has been having some issues with stooling and urinary incontinence but is fairly vague, adding again that he will let me know if he needs assistance.    Followed by neurology, Dr. Carty, every 6 months.  On Aricept 10mg every evening.  Able to recall 0/3 words and name 6 animals in one minute  Patient was not able to draw the clock  Patient lives with  and no longer drives  Cont to f/u with neurology as directed          Relevant Orders    VITAMIN B12    FOLATE    Vitamin D deficiency    Relevant Orders    VITAMIN D,25 HYDROXY (DEFICIENCY)     Other Visit Diagnoses       Fatigue, unspecified type        Relevant Orders    CBC WITH DIFFERENTIAL    Comp Metabolic Panel    TSH WITH REFLEX TO FT4    Need for vaccination        Relevant Orders    Influenza Vaccine, High Dose (65+ Only) (Completed)    Encounter for screening mammogram for malignant neoplasm of breast        Relevant Orders    MA-SCREENING MAMMO BILAT W/TOMOSYNTHESIS W/CAD    Colon cancer screening                       ROS:  Review of Systems   Constitutional:  Negative for chills and fever.   Respiratory:  Negative for shortness of breath.    Cardiovascular:  Negative for chest pain.       OBJECTIVE:     Exam:  /64 (BP Location: Right arm, Patient Position: Sitting, BP Cuff Size: Adult)   Pulse 64   Temp 37 °C (98.6 °F) (Temporal)   Resp 16   Ht 1.6 m (5' 3\")   Wt 75.7 kg (166 lb 12.8 oz)   SpO2 97%   BMI 29.55 kg/m²  Body mass index is 29.55 kg/m².    Physical Exam  Vitals reviewed.   Constitutional:       General: She is not in acute distress.     Appearance: Normal appearance.   Cardiovascular:      Rate and Rhythm: Normal rate and regular rhythm.      Heart sounds: Normal heart sounds.   Pulmonary:      " Effort: Pulmonary effort is normal.      Breath sounds: Normal breath sounds.   Musculoskeletal:      Cervical back: Normal range of motion and neck supple.   Skin:     General: Skin is warm.   Neurological:      General: No focal deficit present.      Mental Status: She is alert.   Psychiatric:         Mood and Affect: Mood normal.         Behavior: Behavior normal.         Judgment: Judgment normal.             Please note that this dictation was created using voice recognition software. I have made every reasonable attempt to correct obvious errors, but I expect that there are errors of grammar and possibly content that I did not discover before finalizing the note.

## 2025-01-16 NOTE — ASSESSMENT & PLAN NOTE
Chronic, uncontrolled.     Latest Labs:   Lab Results   Component Value Date/Time    CHOLSTRLTOT 268 (H) 11/14/2023 11:42 AM     (H) 11/14/2023 11:42 AM     11/14/2023 11:42 AM    TRIGLYCERIDE 68 11/14/2023 11:42 AM      Medications: none    Risk calculator: The ASCVD Risk score (Michelle KAUR, et al., 2019) failed to calculate.

## 2025-01-16 NOTE — LETTER
January 16, 2025      Gerard Ybarra,      Here is some information I got from the nurse to work with you in the clinic.  This is through [facility].  I do not know if this will be beneficial but it certainly would not hurt to look.     [facility]    [email or web address]    [email or web address]      If you have any questions or would like for me to call you, please let me know.          Margie Rayo P.A.-C.

## 2025-01-16 NOTE — LETTER
January 16, 2025        Here is some information I got from the nurse I work with in our clinic.  This is through [facility].  I do not know if this will be beneficial but it certainly would not hurt to look.      [facility]     [email or web address]     [email or web address]        If you have any questions or would like for me to call you, please let me know.                                   Margie Rayo P.A.-C.

## 2025-01-16 NOTE — ASSESSMENT & PLAN NOTE
"Chronic, uncontrolled.  Patient is followed by neurology.  Patient's  is very overwhelmed with her care, saying \"it has always 911.\"  Patient's  does state that her mood is pretty stable and does not get agitated.  Discussed possible resources for respite care a couple hours a day or other outside help.  Patient is adamant he wants to keep her in the home and wants to care for her.  Patient is very polite and adds he will let me know if he needs any help.  She apparently has been having some issues with stooling and urinary incontinence but is fairly vague, adding again that he will let me know if he needs assistance.    Followed by neurology, Dr. Carty, every 6 months.  On Aricept 10mg every evening.  Able to recall 0/3 words and name 6 animals in one minute  Patient was not able to draw the clock  Patient lives with  and no longer drives  Cont to f/u with neurology as directed   "

## 2025-03-12 ENCOUNTER — APPOINTMENT (OUTPATIENT)
Dept: NEUROLOGY | Facility: MEDICAL CENTER | Age: 70
End: 2025-03-12
Attending: PSYCHIATRY & NEUROLOGY
Payer: MEDICARE

## 2025-03-31 ENCOUNTER — TELEPHONE (OUTPATIENT)
Dept: MEDICAL GROUP | Facility: PHYSICIAN GROUP | Age: 70
End: 2025-03-31
Payer: MEDICARE

## 2025-03-31 NOTE — TELEPHONE ENCOUNTER
Pts  called stating he received a letter from you dated 1/16/25 and he would like to discuss with you. He is requesting a call back  [phone]

## 2025-03-31 NOTE — TELEPHONE ENCOUNTER
Just JACOBI  - encouraged patient's  to see about scheduling with you sooner than June. I am concerned her needs may be outpacing his ability to continue caring for her by himself.    Spoke with patient's , Tate, on the phone.  He feels her dementia has worsened over the past 3 months.  She is more tearful/sad and is more fearful to have her him leave her.  Patient's daughter has come over to stay with her for a few hours with Tate goes out but she has started to make the connection that when her daughter shows up that means Tate is leaving and gets so upset it's hard for Tate to leave.  Tate reports she is not agitated or combative.  She has become more fearful to go to bed without him.

## 2025-05-16 ENCOUNTER — HOSPITAL ENCOUNTER (OUTPATIENT)
Dept: LAB | Facility: MEDICAL CENTER | Age: 70
End: 2025-05-16
Attending: PHYSICIAN ASSISTANT
Payer: MEDICARE

## 2025-05-16 DIAGNOSIS — E55.9 VITAMIN D DEFICIENCY: ICD-10-CM

## 2025-05-16 DIAGNOSIS — G30.1 LATE ONSET ALZHEIMER'S DEMENTIA WITHOUT BEHAVIORAL DISTURBANCE (HCC): ICD-10-CM

## 2025-05-16 DIAGNOSIS — E78.5 DYSLIPIDEMIA: ICD-10-CM

## 2025-05-16 DIAGNOSIS — F02.80 LATE ONSET ALZHEIMER'S DEMENTIA WITHOUT BEHAVIORAL DISTURBANCE (HCC): ICD-10-CM

## 2025-05-16 DIAGNOSIS — R53.83 FATIGUE, UNSPECIFIED TYPE: ICD-10-CM

## 2025-05-16 LAB
BASOPHILS # BLD AUTO: 0.4 % (ref 0–1.8)
BASOPHILS # BLD: 0.03 K/UL (ref 0–0.12)
EOSINOPHIL # BLD AUTO: 0.02 K/UL (ref 0–0.51)
EOSINOPHIL NFR BLD: 0.3 % (ref 0–6.9)
ERYTHROCYTE [DISTWIDTH] IN BLOOD BY AUTOMATED COUNT: 47 FL (ref 35.9–50)
HCT VFR BLD AUTO: 47.3 % (ref 37–47)
HGB BLD-MCNC: 14.9 G/DL (ref 12–16)
IMM GRANULOCYTES # BLD AUTO: 0.02 K/UL (ref 0–0.11)
IMM GRANULOCYTES NFR BLD AUTO: 0.3 % (ref 0–0.9)
LYMPHOCYTES # BLD AUTO: 1.34 K/UL (ref 1–4.8)
LYMPHOCYTES NFR BLD: 18.2 % (ref 22–41)
MCH RBC QN AUTO: 28.8 PG (ref 27–33)
MCHC RBC AUTO-ENTMCNC: 31.5 G/DL (ref 32.2–35.5)
MCV RBC AUTO: 91.5 FL (ref 81.4–97.8)
MONOCYTES # BLD AUTO: 0.5 K/UL (ref 0–0.85)
MONOCYTES NFR BLD AUTO: 6.8 % (ref 0–13.4)
NEUTROPHILS # BLD AUTO: 5.46 K/UL (ref 1.82–7.42)
NEUTROPHILS NFR BLD: 74 % (ref 44–72)
NRBC # BLD AUTO: 0 K/UL
NRBC BLD-RTO: 0 /100 WBC (ref 0–0.2)
PLATELET # BLD AUTO: 228 K/UL (ref 164–446)
PMV BLD AUTO: 9.2 FL (ref 9–12.9)
RBC # BLD AUTO: 5.17 M/UL (ref 4.2–5.4)
WBC # BLD AUTO: 7.4 K/UL (ref 4.8–10.8)

## 2025-05-16 PROCEDURE — 84443 ASSAY THYROID STIM HORMONE: CPT

## 2025-05-16 PROCEDURE — 80061 LIPID PANEL: CPT

## 2025-05-16 PROCEDURE — 36415 COLL VENOUS BLD VENIPUNCTURE: CPT

## 2025-05-16 PROCEDURE — 82607 VITAMIN B-12: CPT

## 2025-05-16 PROCEDURE — 80053 COMPREHEN METABOLIC PANEL: CPT

## 2025-05-16 PROCEDURE — 85025 COMPLETE CBC W/AUTO DIFF WBC: CPT

## 2025-05-16 PROCEDURE — 82306 VITAMIN D 25 HYDROXY: CPT

## 2025-05-16 PROCEDURE — 82746 ASSAY OF FOLIC ACID SERUM: CPT

## 2025-05-17 LAB
25(OH)D3 SERPL-MCNC: 44 NG/ML (ref 30–100)
ALBUMIN SERPL BCP-MCNC: 4.1 G/DL (ref 3.2–4.9)
ALBUMIN/GLOB SERPL: 1.5 G/DL
ALP SERPL-CCNC: 82 U/L (ref 30–99)
ALT SERPL-CCNC: 23 U/L (ref 2–50)
ANION GAP SERPL CALC-SCNC: 9 MMOL/L (ref 7–16)
AST SERPL-CCNC: 22 U/L (ref 12–45)
BILIRUB SERPL-MCNC: 0.9 MG/DL (ref 0.1–1.5)
BUN SERPL-MCNC: 14 MG/DL (ref 8–22)
CALCIUM ALBUM COR SERPL-MCNC: 9.9 MG/DL (ref 8.5–10.5)
CALCIUM SERPL-MCNC: 10 MG/DL (ref 8.5–10.5)
CHLORIDE SERPL-SCNC: 100 MMOL/L (ref 96–112)
CHOLEST SERPL-MCNC: 240 MG/DL (ref 100–199)
CO2 SERPL-SCNC: 27 MMOL/L (ref 20–33)
CREAT SERPL-MCNC: 0.77 MG/DL (ref 0.5–1.4)
FASTING STATUS PATIENT QL REPORTED: NORMAL
FOLATE SERPL-MCNC: 13.2 NG/ML
GFR SERPLBLD CREATININE-BSD FMLA CKD-EPI: 83 ML/MIN/1.73 M 2
GLOBULIN SER CALC-MCNC: 2.8 G/DL (ref 1.9–3.5)
GLUCOSE SERPL-MCNC: 95 MG/DL (ref 65–99)
HDLC SERPL-MCNC: 94 MG/DL
LDLC SERPL CALC-MCNC: 127 MG/DL
POTASSIUM SERPL-SCNC: 4.3 MMOL/L (ref 3.6–5.5)
PROT SERPL-MCNC: 6.9 G/DL (ref 6–8.2)
SODIUM SERPL-SCNC: 136 MMOL/L (ref 135–145)
TRIGL SERPL-MCNC: 93 MG/DL (ref 0–149)
TSH SERPL DL<=0.005 MIU/L-ACNC: 1.2 UIU/ML (ref 0.38–5.33)
VIT B12 SERPL-MCNC: 425 PG/ML (ref 211–911)

## 2025-05-19 ENCOUNTER — RESULTS FOLLOW-UP (OUTPATIENT)
Dept: MEDICAL GROUP | Facility: PHYSICIAN GROUP | Age: 70
End: 2025-05-19

## 2025-06-24 ENCOUNTER — OFFICE VISIT (OUTPATIENT)
Dept: NEUROLOGY | Facility: MEDICAL CENTER | Age: 70
End: 2025-06-24
Attending: PSYCHIATRY & NEUROLOGY
Payer: MEDICARE

## 2025-06-24 VITALS
TEMPERATURE: 97.6 F | SYSTOLIC BLOOD PRESSURE: 108 MMHG | OXYGEN SATURATION: 97 % | BODY MASS INDEX: 26.5 KG/M2 | WEIGHT: 155.2 LBS | HEIGHT: 64 IN | HEART RATE: 67 BPM | DIASTOLIC BLOOD PRESSURE: 64 MMHG

## 2025-06-24 DIAGNOSIS — F02.80 LATE ONSET ALZHEIMER'S DEMENTIA WITHOUT BEHAVIORAL DISTURBANCE (HCC): Primary | ICD-10-CM

## 2025-06-24 DIAGNOSIS — G30.1 LATE ONSET ALZHEIMER'S DEMENTIA WITHOUT BEHAVIORAL DISTURBANCE (HCC): Primary | ICD-10-CM

## 2025-06-24 PROCEDURE — 99213 OFFICE O/P EST LOW 20 MIN: CPT | Performed by: PSYCHIATRY & NEUROLOGY

## 2025-06-24 RX ORDER — ALPRAZOLAM 0.25 MG
0.25 TABLET ORAL 3 TIMES DAILY PRN
Qty: 30 TABLET | Refills: 0 | Status: SHIPPED | OUTPATIENT
Start: 2025-06-24 | End: 2025-07-24

## 2025-06-24 ASSESSMENT — PATIENT HEALTH QUESTIONNAIRE - PHQ9
CLINICAL INTERPRETATION OF PHQ2 SCORE: 4
5. POOR APPETITE OR OVEREATING: 2 - MORE THAN HALF THE DAYS
SUM OF ALL RESPONSES TO PHQ QUESTIONS 1-9: 20

## 2025-06-24 ASSESSMENT — FIBROSIS 4 INDEX: FIB4 SCORE: 1.41

## 2025-06-24 NOTE — PROGRESS NOTES
"Subjective     Monique Jaycee Ortiz is a 69 y.o. female who presents as always with her  Tate, who provides all the history, for follow-up, with a history of moderate Alzheimer's disease.     YARY Goldstein continues to move along at home, but with ever more attention from her .  Eating requires more insistence on his part to get her to eat.  She has lost about 10 pounds.  She states she does enjoy eating.  She has restricted herself to about 6 or 7 foods that she likes the most.    The incontinence is happening infrequently still more often than it has in the past.  She is also probably been falling, bruises appear on her body and she has no recollection of how or when it might have happened.  She does walk with him now on a regular basis, enjoys her self.  They are also now sleeping together at night, a very happy experience for both.  She often dreams vividly, engaging in conversation with someone else as part of the dream, and there was a lucidity in fluency with speech and emotional construct that he hears.    She does take her medications when given to her.  There is more sundowning in the afternoons, she appears more anxious in the circumstances.  She does not hallucinate.  He sometimes talks her out of it in short interval.  It is simply happening more commonly.    Speech is more curtailed, this is more frustrating for Tate.  She also requires more and more assistance with her ADLs.    Medical, surgical and family histories are reviewed, there are no new drug allergies.  She is on Celexa 10 mg daily, vitamin D, and donepezil 10 mg every evening.    Review of Systems   Unable to perform ROS: Dementia     Objective     /64 (BP Location: Left arm, Patient Position: Sitting, BP Cuff Size: Adult)   Pulse 67   Temp 36.4 °C (97.6 °F) (Temporal)   Ht 1.626 m (5' 4\")   Wt 70.4 kg (155 lb 3.3 oz)   SpO2 97%   BMI 26.64 kg/m²      Physical Exam    She appears in no acute distress.  She is clean " and appropriately dressed.  She is quite cooperative.  Her vital signs are stable.  There is no malar rash.  The neck is supple, range of motion is full.  Cardiac evaluation is unremarkable.     Neurological Exam    She is oriented to person, she identifies Tate as her .  She can follow some simple commands but is very perseverative.  She shakes the examiner's hand when offered to her.  She has difficulty with right and left side differentiation.  Verbal responses are simplified to 3 or 4 word answers, and quite redundant.  She can answer some simple yes and no questions    PERRLA/EOMI and visual fields are grossly full.  There is no bulbar dysfunction, facial movements are symmetric.    She moves all 4 extremities symmetrically.  Grasp is intact.  Reflex exam is deferred.    She walks with only slight instability, but she grabs the examiner's hand firmly as we walk down the hallway together.  Station is normal, stride length is maintained.  She turns easily.  There is no appendicular dystaxia with the upper extremities.    Sensory exam was deferred.  Assessment & Plan  Late onset Alzheimer's dementia without behavioral disturbance (HCC)  Things are progressing as would be expected.  For the anxiety, I recommended Xanax 0.25 mg tablets to be used as needed.  I am not convinced Celexa itself is proving effective, I would like to stop the drug as a maintenance treatment.    Tate had questions about further testing being done as preventative treatments for medical problems.  Including things like colonoscopy, mammogram, etc., given the complexity of anesthesia when required, I think it probably worthwhile only to pursue if needed.  Changes in venue, explanations of tests that need to be done, etc. would likely prove more anxiety provoking than anything else.  Tate was quite comfortable with this.  He was also told that her rating scale of severity from 0 through 7 is probably 5.    For falling and gait  difficulties always will be difficult and will progress slowly and steadily.  He is doing the best that he can to keep an eye on his wife but none of this is perfect.  We will follow-up in 6 months.    Orders:    ALPRAZolam (XANAX) 0.25 MG Tab; Take 1 Tablet by mouth 3 times a day as needed for Anxiety for up to 30 days. Indications: Feeling Anxious    Time: 40 minutes in total spent on patient care including.  From charting, record review, discussion with healthcare staff and documentation.  This includes face-to-face time for exam, review, discussion, as well as counseling and coordinating care.

## 2025-06-24 NOTE — ASSESSMENT & PLAN NOTE
Things are progressing as would be expected.  For the anxiety, I recommended Xanax 0.25 mg tablets to be used as needed.  I am not convinced Celexa itself is proving effective, I would like to stop the drug as a maintenance treatment.    Tate had questions about further testing being done as preventative treatments for medical problems.  Including things like colonoscopy, mammogram, etc., given the complexity of anesthesia when required, I think it probably worthwhile only to pursue if needed.  Changes in venue, explanations of tests that need to be done, etc. would likely prove more anxiety provoking than anything else.  Tate was quite comfortable with this.  He was also told that her rating scale of severity from 0 through 7 is probably 5.    For falling and gait difficulties always will be difficult and will progress slowly and steadily.  He is doing the best that he can to keep an eye on his wife but none of this is perfect.  We will follow-up in 6 months.    Orders:    ALPRAZolam (XANAX) 0.25 MG Tab; Take 1 Tablet by mouth 3 times a day as needed for Anxiety for up to 30 days. Indications: Feeling Anxious

## 2025-07-09 ENCOUNTER — OFFICE VISIT (OUTPATIENT)
Dept: MEDICAL GROUP | Facility: PHYSICIAN GROUP | Age: 70
End: 2025-07-09
Payer: MEDICARE

## 2025-07-09 VITALS
WEIGHT: 154.6 LBS | OXYGEN SATURATION: 98 % | DIASTOLIC BLOOD PRESSURE: 62 MMHG | RESPIRATION RATE: 19 BRPM | HEART RATE: 100 BPM | TEMPERATURE: 97.6 F | HEIGHT: 64 IN | BODY MASS INDEX: 26.4 KG/M2 | SYSTOLIC BLOOD PRESSURE: 100 MMHG

## 2025-07-09 DIAGNOSIS — G30.1 LATE ONSET ALZHEIMER'S DEMENTIA WITHOUT BEHAVIORAL DISTURBANCE (HCC): ICD-10-CM

## 2025-07-09 DIAGNOSIS — R41.89 COGNITIVE AND BEHAVIORAL CHANGES: Primary | ICD-10-CM

## 2025-07-09 DIAGNOSIS — F02.80 LATE ONSET ALZHEIMER'S DEMENTIA WITHOUT BEHAVIORAL DISTURBANCE (HCC): ICD-10-CM

## 2025-07-09 DIAGNOSIS — R46.89 COGNITIVE AND BEHAVIORAL CHANGES: Primary | ICD-10-CM

## 2025-07-09 PROCEDURE — 3078F DIAST BP <80 MM HG: CPT | Performed by: PHYSICIAN ASSISTANT

## 2025-07-09 PROCEDURE — 3074F SYST BP LT 130 MM HG: CPT | Performed by: PHYSICIAN ASSISTANT

## 2025-07-09 PROCEDURE — 99214 OFFICE O/P EST MOD 30 MIN: CPT | Performed by: PHYSICIAN ASSISTANT

## 2025-07-09 ASSESSMENT — FIBROSIS 4 INDEX: FIB4 SCORE: 1.41

## 2025-07-09 NOTE — ASSESSMENT & PLAN NOTE
Chronic, uncontrolled.   Patient's , Tate, notices worsening cognition over the past week or so.  They last saw neurology 6/24/2025 where they discontinue citalopram and donepezil.  She has been more agitated, less verbal.  She was started on alprazolam 0.25 mg which does seem to help the agitation and anxiety.  With the alprazolam she sleeps better, less interruption (up around 4 times; without alprazolam up around 15 times).  Mornings are her best time.  Lunchtime she becomes anxious if they are not doing some sort of activity.  Once the activity has started, however, she is anxious to get back home, having very little fulfillment in the activity itself.  Sundowning starts around 1800 hrs.  They typically go to bed around 2100 hrs.  Patient has not been sick and has not had any fevers.    We are checking a urine today.  Tate will try to collect this at home (hat and urine cup were given in clinic).

## 2025-07-09 NOTE — PROGRESS NOTES
"SUBJECTIVE:     CC: Acute change in cognition    HPI:   Monique presents today with the following:    ASSESSMENT & PLAN by Problem:     Problem   Late Onset Alzheimer's Dementia Without Behavioral Disturbance (Hcc)    Chronic, uncontrolled.  Followed by neurology, Dr. Carty, every 6 months.  Continue alprazolam as directed.  Will contact Tate with UA results.             Return if symptoms worsen or fail to improve.        HPI:     Problem List Items Addressed This Visit       Late onset Alzheimer's dementia without behavioral disturbance (HCC)    Chronic, uncontrolled.   Patient's , Tate, notices worsening cognition over the past week or so.  They last saw neurology 6/24/2025 where they discontinue citalopram and donepezil.  She has been more agitated, less verbal.  She was started on alprazolam 0.25 mg which does seem to help the agitation and anxiety.  With the alprazolam she sleeps better, less interruption (up around 4 times; without alprazolam up around 15 times).  Mornings are her best time.  Lunchtime she becomes anxious if they are not doing some sort of activity.  Once the activity has started, however, she is anxious to get back home, having very little fulfillment in the activity itself.  Sundowning starts around 1800 hrs.  They typically go to bed around 2100 hrs.  Patient has not been sick and has not had any fevers.    We are checking a urine today.  Tate will try to collect this at home (hat and urine cup were given in clinic).          Other Visit Diagnoses         Cognitive and behavioral changes    -  Primary    Relevant Orders    URINALYSIS,CULTURE IF INDICATED                   ROS:  Review of Systems   Reason unable to perform ROS: Due to dementia.       OBJECTIVE:     Exam:  /62   Pulse 100   Temp 36.4 °C (97.6 °F) (Temporal)   Resp 19   Ht 1.626 m (5' 4\")   Wt 70.1 kg (154 lb 9.6 oz)   SpO2 98%   BMI 26.54 kg/m²  Body mass index is 26.54 kg/m².    Physical Exam  Vitals " reviewed.   Constitutional:       General: She is not in acute distress.     Appearance: Normal appearance.   Pulmonary:      Effort: Pulmonary effort is normal.   Neurological:      Mental Status: She is alert.      Comments: Does not speak during the visit.  Awake.  Looking around.                  Time: 35 minutes in total spent on patient care including pre-charting, record review, documentation and, at times, discussion with healthcare staff.  This includes face-to-face time for exam, review, discussion, as well as counseling and coordinating care.         Please note that this dictation was created using voice recognition software. I have made every reasonable attempt to correct obvious errors, but I expect that there are errors of grammar and possibly content that I did not discover before finalizing the note.

## 2025-07-09 NOTE — Clinical Note
Tate notices a significant change in her agitation and anxiety over the last week or so.  He is wondering if this is the citalopram and donepezil finally out of her system.  Alprazolam does seem to be helping and she is sleeping better.  She has only used 3 pills since her 6/24/25 visit with you. I am checking a UA.  Low suspicion for pneumonia given her sats.  If UA is negative, was thinking it might be worth restarting citalopram to see if that was, indeed, helping a little bit.  Any insight is appreciated.   Helene

## 2025-07-17 ENCOUNTER — TELEPHONE (OUTPATIENT)
Facility: MEDICAL CENTER | Age: 70
End: 2025-07-17
Payer: MEDICARE

## 2025-07-17 NOTE — TELEPHONE ENCOUNTER
Please advise;  Pt  left a voicemail in regards to this pts sx getting increasingly worse. He initally stet Pt  stated that her gripping ability has almost diminished, her walking ability is going down as the days go on. Her anxiety is hightened and the pt is emotional and confused. PT  stated that he noticed all of this after we recently stopped two of the pts medications. Pts  did also mention that the pt is having some pretty significant urinary frequency- pts claire mentioned her having to get up and out of bed about 15x per night, which is dangerous given she is having weakness.   Pts  did also mention that she is starting to stabilize. Is there anything that can be done or any advice to give from our standpoint? Let me know. Thank you in advance!  [facility]

## 2025-08-19 DIAGNOSIS — F02.80 LATE ONSET ALZHEIMER'S DEMENTIA WITHOUT BEHAVIORAL DISTURBANCE (HCC): ICD-10-CM

## 2025-08-19 DIAGNOSIS — G30.1 LATE ONSET ALZHEIMER'S DEMENTIA WITHOUT BEHAVIORAL DISTURBANCE (HCC): ICD-10-CM

## 2025-08-21 RX ORDER — ALPRAZOLAM 0.25 MG
0.25 TABLET ORAL 3 TIMES DAILY PRN
Qty: 30 TABLET | Refills: 0 | Status: SHIPPED | OUTPATIENT
Start: 2025-08-21 | End: 2025-09-20